# Patient Record
Sex: FEMALE | Race: WHITE | NOT HISPANIC OR LATINO | Employment: FULL TIME | URBAN - METROPOLITAN AREA
[De-identification: names, ages, dates, MRNs, and addresses within clinical notes are randomized per-mention and may not be internally consistent; named-entity substitution may affect disease eponyms.]

---

## 2023-07-26 ENCOUNTER — HOSPITAL ENCOUNTER (OUTPATIENT)
Dept: DATA CONVERSION | Facility: HOSPITAL | Age: 53
End: 2023-07-26
Attending: SURGERY | Admitting: SURGERY
Payer: COMMERCIAL

## 2023-07-26 DIAGNOSIS — C50.411 MALIGNANT NEOPLASM OF UPPER-OUTER QUADRANT OF RIGHT FEMALE BREAST (MULTI): ICD-10-CM

## 2023-07-26 DIAGNOSIS — Z17.0 ESTROGEN RECEPTOR POSITIVE STATUS (ER+): ICD-10-CM

## 2023-07-26 DIAGNOSIS — C50.911 MALIGNANT NEOPLASM OF UNSPECIFIED SITE OF RIGHT FEMALE BREAST (MULTI): ICD-10-CM

## 2023-07-26 DIAGNOSIS — C50.919 MALIGNANT NEOPLASM OF UNSPECIFIED SITE OF UNSPECIFIED FEMALE BREAST (MULTI): ICD-10-CM

## 2023-07-26 DIAGNOSIS — R92.8 OTHER ABNORMAL AND INCONCLUSIVE FINDINGS ON DIAGNOSTIC IMAGING OF BREAST: ICD-10-CM

## 2023-07-26 DIAGNOSIS — C77.3 SECONDARY AND UNSPECIFIED MALIGNANT NEOPLASM OF AXILLA AND UPPER LIMB LYMPH NODES (MULTI): ICD-10-CM

## 2023-08-07 LAB
COMPLETE PATHOLOGY REPORT: NORMAL
CONVERTED ADDENDUM DIAGNOSIS 2: NORMAL
CONVERTED CLINICAL DIAGNOSIS-HISTORY: NORMAL
CONVERTED FINAL DIAGNOSIS: NORMAL
CONVERTED FINAL REPORT PDF LINK TO COPY AND PASTE: NORMAL
CONVERTED GROSS DESCRIPTION: NORMAL
CONVERTED SYNOPTIC DIAGNOSIS: NORMAL

## 2023-09-06 ENCOUNTER — HOSPITAL ENCOUNTER (OUTPATIENT)
Dept: DATA CONVERSION | Facility: HOSPITAL | Age: 53
End: 2023-09-06
Attending: SURGERY | Admitting: SURGERY
Payer: COMMERCIAL

## 2023-09-06 DIAGNOSIS — C50.911 MALIGNANT NEOPLASM OF UNSPECIFIED SITE OF RIGHT FEMALE BREAST (MULTI): ICD-10-CM

## 2023-09-06 DIAGNOSIS — Z17.0 ESTROGEN RECEPTOR POSITIVE STATUS (ER+): ICD-10-CM

## 2023-09-06 LAB
HEMATOCRIT (%) IN BLOOD BY AUTOMATED COUNT: 41.5 % (ref 36–46)
HEMOGLOBIN (G/DL) IN BLOOD: 13.8 G/DL (ref 12–16)

## 2023-09-12 LAB
COMPLETE PATHOLOGY REPORT: NORMAL
CONVERTED CLINICAL DIAGNOSIS-HISTORY: NORMAL
CONVERTED FINAL DIAGNOSIS: NORMAL
CONVERTED FINAL REPORT PDF LINK TO COPY AND PASTE: NORMAL
CONVERTED GROSS DESCRIPTION: NORMAL

## 2023-09-29 VITALS — BODY MASS INDEX: 30.35 KG/M2 | WEIGHT: 171.3 LBS | HEIGHT: 63 IN

## 2023-09-29 VITALS
SYSTOLIC BLOOD PRESSURE: 162 MMHG | BODY MASS INDEX: 34.65 KG/M2 | RESPIRATION RATE: 18 BRPM | HEIGHT: 63 IN | DIASTOLIC BLOOD PRESSURE: 73 MMHG | HEART RATE: 72 BPM | WEIGHT: 195.55 LBS | TEMPERATURE: 97 F

## 2023-09-30 NOTE — H&P
"    History & Physical Reviewed:   Pregnant/Lactating:  ·  Are You Pregnant no (1)   ·  Are You Currently Breastfeeding no (1)     I have reviewed the History and Physical dated:  10-Aug-2023   History and Physical reviewed and relevant findings noted. Patient examined to review pertinent physical  findings.: No significant changes   Home Medications Reviewed: no changes noted   Allergies Reviewed: no changes noted       ERAS (Enhanced Recovery After Surgery):  ·  ERAS Patient: no     Consent:   COVID-19 Consent:  ·  COVID-19 Risk Consent Surgeon has reviewed key risks related to the risk of eleni COVID-19 and if they contract COVID-19 what the risks are.       Electronic Signatures:  Odalis Richardson (MD)  (Signed 06-Sep-2023 22:07)   Authored: History & Physical Reviewed, ERAS, Consent,  Note Completion      Last Updated: 06-Sep-2023 22:07 by Odalis Richardson (MD)    References:  1.  Data Referenced From \"Patient Profile - Preop v3\" 05-Sep-2023 11:09   "

## 2023-09-30 NOTE — H&P
History of Present Illness:   Pregnant/Lactating:  ·  Are You Pregnant no   ·  Are You Currently Breastfeeding no     History Present Illness:  Reason for surgery: Right invasive ductal carcinoma   HPI:    Amber Gaston is a 52 year old F with right invasive ductal caricnoma ER>95%, CA neg, HER2 neg, grade 1-2, stage 1A. Located at 10 o'clock 4 cm from nipple measuring  8 x 5 x 9 mm.     Past Medical History:  Chron's disease    Past Surgical History:  none    Social History:  former smoker  social EtOH    Family History:  breast, esophageal and renal carcinoma    Medications:  Stelara    Allergies:  NKDA    ROS:  negative unless otherwise stated above      Allergies:        Allergies:  ·  No Known Allergies :     Home Medication Review:   Home Medications Reviewed: yes     Impression/Procedure:   ·  Impression and Planned Procedure: Right magseed localized partial mastectomy, sentinel lymph node biopsy       ERAS (Enhanced Recovery After Surgery):  ·  ERAS Patient: no       Vital Signs:  Temperature C: 36.1 degrees C   Temperature F: 96.9 degrees F   Heart Rate: 72 beats per minute   Respiratory Rate: 18 breath per minute   Blood Pressure Systolic: 162 mm/Hg   Blood Pressure Diastolic: 73 mm/Hg     Physical Exam by System:    Constitutional: Awake, alert, no acute distress   ENMT: Moist mucus membranes   Respiratory/Thorax: No increased work of breathing  on room air   Cardiovascular: Regular rate   Gastrointestinal: Abdomen soft, nontender, nondistended   Genitourinary: Voiding spontaneously   Musculoskeletal: Moves all extremities   Extremities: No peripheral edema   Neurological: No focal deficits   Breast: Operative site marked   Psychological: Appropriate   Skin: Warm and dry     Consent:   COVID-19 Consent:  ·  COVID-19 Risk Consent Surgeon has reviewed key risks related to the risk of eleni COVID-19 and if they contract COVID-19 what the risks are.     Attestation:   Note Completion:  I am a:   Resident/Fellow   Attending Attestation I saw and evaluated the patient.  I personally obtained the key and critical portions of the history and physical exam or was physically present for key and  critical portions performed by the resident/fellow. I reviewed the resident/fellow?s documentation and discussed the patient with the resident/fellow.  I agree with the resident/fellow?s medical decision making as documented in the note.     I personally evaluated the patient on 26-Jul-2023         Electronic Signatures:  Carmen Retana (MD (Resident))  (Signed 26-Jul-2023 12:16)   Authored: History of Present Illness, Allergies, Home  Medication Review, Impression/Procedure, ERAS, Physical Exam, Consent, Note Completion  Odalis Richardson)  (Signed 28-Jul-2023 11:49)   Authored: Impression/Procedure, Note Completion   Co-Signer: History of Present Illness, Allergies, Home Medication Review, Impression/Procedure, ERAS, Physical Exam, Consent, Note Completion      Last Updated: 28-Jul-2023 11:49 by Odalis Richardson)

## 2023-10-01 NOTE — OP NOTE
PROCEDURE DETAILS    Preoperative Diagnosis:  Right breast invasive ductal carcinoma, ER+/WV-/HER2-, with positive margin  Postoperative Diagnosis:  Right breast invasive ductal carcinoma, ER+/WV-/HER2-, with positive margin  Surgeon: Odalis Richardson  Resident/Fellow/Other Assistant: Cindy Escobedo    Procedure:  1. Right breast re-excision partial mastectomy  2.  Right breast local tissue rearrangement      Anesthesia: No anesthesiologist associated with this case  Estimated Blood Loss: 5 cc  Findings: right breast partial mastectomy cavity with clips intact  Specimens(s) Collected: yes,  right breast margins: superior medial, superior lateral    Complications: none immediate   Patient Returned To/Condition: PACU/stable        Operative Report:   INDICATIONS FOR PROCEDURE:    Amber Gaston is a 52-year-old female who underwent right breast partial mastectomy and SLN B 7/26/23. Surgical pathology yielded [id number] and the superior margin had invasive carcinoma  at the superior margin. I recommended margin re-excision. Following a detailed discussion regarding risks, benefits, and alternatives, informed consent was obtained, and we agreed to proceed.      DESCRIPTION OF PROCEDURE:    The patient was brought to the operating room and positioned supine on the OR table.  Following patient identification and a time-out procedure, SCDs were applied, and antibiotics  were administered.  General anesthesia was initiated. The operative field was then prepped and draped in the standard sterile fashion.       We turned our attention to the right breast. The previous periareolar  incision was opened sharply with a 15 blade. Old suture material was removed. Dissection was carried down through the skin and subcutaneous tissues. Soft tissue flaps were then raised  in all directions: superior, inferior, medial, and lateral. Dissection was carried out sharply and bluntly moving laterally and superiorly. Local tissue  rearrangement had been performed during the previous partial mastectomy, and the re-approximated tissue  was  bluntly and using electrocautery to expose the original cavity. We identified the previous margins marked with marking clips.  The superior margin was re-excised: superior medial and superior lateral. Notably, we were down to pectoralis  fasia posteriorly. All specimens were oriented with a clip and ink on the new margin and sent to pathology for permanent section. The cavity was copiously irrigated with sterile saline solution.  Hemostasis was achieved. 2 new marking clips were placed  within the cavity on the sueperior margin.      The soft tissue defect from her prior partial mastectomy was 20 cm squared, and with new margin re-excision, it now measured 30 cm squared. Local tissue rearrangement was performed over a larger area with further dissection radially to close the defect.  Surrounding breast parenchyma was mobilized circumferentially from the anterior mastectomy plane anteriorly and posteriorly from the pectoralis fascia.  Once mobilized, the superior-medial and inferior-lateral pillars of breast tissue were advanced and  secured to each other with a dyed 3-0 Vicryl suture in an interrupted fashion to close the defect. Hemostasis was reconfirmed. The more superficial layer of tissue that had been mobilized at the beginning of the procedure was closed with 3-0 Vicryl suture  in an interrupted fashion in order to create a cosmetic effect.     0.5% Marcaine was injected subcutaneously for analgesia. The incision was then closed in layers with an interrupted 3-0 Vicryl in the deep dermis followed by a running subcuticular 4-0 Monocryl for the skin. Skin glue, a sterile dressing, fluffs, and  a surgical bra were then applied.    The patient tolerated the procedure well.  There were no immediate complications.  All counts were correct. Estimated blood loss was 5 cc.  I was present for and  performed the entire procedure. The patient was then awakened and transported to the PACU  in stable condition.    Odalis Richardson MD  Breast Surgical Oncology   pager [phone]      Note Recipients:   Sammie Mccoy DO                        Attestation:   Note Completion:  Attending Attestation I performed the procedure without a resident         Electronic Signatures:  Odalis Richardson)  (Signed 06-Sep-2023 14:29)   Authored: Post-Operative Note, Chart Review, Note Completion      Last Updated: 06-Sep-2023 14:29 by Odalis Richardson)

## 2023-10-02 NOTE — OP NOTE
PROCEDURE DETAILS    Preoperative Diagnosis:  Right breast invasive ductal carcinoma, ER+/AR-/HER-, clinical stage IA  Postoperative Diagnosis:  Right breast invasive ductal carcinoma, ER+/AR-/HER-, clinical stage IA  Surgeon: Dr. Odalis Richardson  Resident/Fellow/Other Assistant: Carmen Retana MD    Procedure:  1.  Injection of radiotracer for intraoperative lymphatic mapping.  2.  Injection of isosulfan blue for intraoperative lymphatic mapping.  3.  Right axillary sentinel lymph node biopsy with dual tracer intraoperative lymphatic mapping.  4.  Right breast Magseed localized partial mastectomy.  5.  Right breast local tissue rearrangement.  6. Intraoperative specimen radiograph interpretation    Anesthesia: General  Estimated Blood Loss: 20 mL  Blood Replaced: None  Findings: Right breast tissue with Magseed and clip in specimen on xray; SLN x4  Specimens(s) Collected: yes,  Right breast tissue, shave cavity margins; SLN x4   Complications: None apparent  IV Fluids: 1000 mL crystalloid  Urine Output: N/a  Drains and/or Catheters: None  Patient Returned To/Condition: PACU / stable        Operative Report:   INDICATIONS FOR PROCEDURE:    Amber Gaston is a 52-year-old female who presented with a screen detected right breast mass for which core needle biopsy yielded invasive ductal carcinoma, ER+, AR-, HER2-; [id number]  Stage IA.  We met in surgical consultation, and I personally reviewed her imaging and pathology. I recommended right partial mastectomy, and right axillary sentinel lymph node biopsy. Following a detailed discussion regarding risks, benefits, and alternatives,  informed consent was obtained, and we agreed to proceed. Prior to her procedure today she had a magnetic seed (Magseed) placed in the previously biopsied right breast for intraoperative localization purposes.  My personal review of her post procedure  images demonstrated the Magseed in good position relative to the mass and biopsy clip.      DESCRIPTION OF PROCEDURE:    The patient was brought to the operating room and positioned supine on the OR table.  Following patient identification and a time-out procedure, SCDs were applied, and antibiotics  were administered.  General anesthesia was initiated. I personally injected technetium-99 and 4mL of isosulfan blue into the patient? s right breast for dual tracer intraoperative lymphatic mapping.   The breast was massaged for 5 minutes. I used the Neoprobe to confirm uptake of the radiotracer in the breast and axilla, and the Sentimag probe to cofirm placement of the Magseed within  the right breast.  The operative field was prepped and draped in a standard sterile fashion.     We turned our attention to the right breast. The cancer was located in the upper outer quadrant at mid depth. A periareolar incision was chosen to hide the surgical scar in a cosmetic location. 1% lidocaine was injected subcutaneously.  The skin was incised  sharply.  Dissection was carried down through skin and subcutaneous tissues. Soft tissue flaps were then raised in all directions: superior, inferior, medial, and lateral.  The tumor was located superior and lateral to our incision; therefore, a tunneling  technique was used.  I identified the anterior mastectomy plane between the subcutaneous tissues and the anterior breast parenchyma. I then developed the mastectomy flap widely up to and beyond the lesion. The Sentimag probe was used to identify the Magseed  in the area targeted for excision, and a marking stitch was placed within it for dissection purposes. This area was widely and circumferentially dissected from the surrounding tissues using electrocautery.  The specimen was excised and labeled as right  breast tissue.  It was marked with a double short stitch superior, double long stitch lateral, and a single stitch anterior. I personally inked all 6 margins of the entire specimen with the Vector kit using colors as  prescribed.  It was placed into the  Trident machine for an intraoperative radiograph which demonstrated the mass, the biopsy clip, and the Magseed within the specimen.  I personally interpreted these images intraoperatively.  The specimen was then sent to Pathology for permanent section.     I then proceeded to take shave cavity margins circumferentially: superior, medial, inferior, lateral, anterior, and posterior. Each was removed, marked with a clip and ink on the new  margin, and sent separately to Pathology for permanent section. The excision cavity was copiously irrigated with warm sterile saline solution.  Hemostasis was achieved.  I then placed marking clips circumferentially around the cavity: superior, inferior,  medial, lateral, posterior and anterior.     The specimen measured 2 cm x 3 cm x 2 cm, and with shave cavity margins, the soft tissue defect was approximately 20 sq cm; therefore, local tissue rearrangement was performed. Surrounding  breast parenchyma was mobilized circumferentially from the anterior mastectomy plane anteriorly and posteriorly from within the breast parenchyma.  Once mobilized, the superior medial and inferior lateral pillars were advanced centrally and secured to  each other with a dyed 3-0 Vicryl suture in an interrupted fashion to close the defect. Hemostasis was reconfirmed. The more superficial layer of tissue that had been mobilized in the breast at the beginning of the procedure was similarly closed in order  to create a cosmetic effect.     We turned our attention to the axilla.  An incision was marked at the base of the hair-bearing skin.  1% lidocaine was injected subcutaneously. An incision was made sharply.  Dissection  was carried down through the skin and subcutaneous tissues to the clavipectoral fascia, which was widely incised.  I used the Neoprobe to identify an area of increased radiotracer uptake, which led us to a lymph node. It was carefully dissected from  surrounding  tissues using electrocautery.  Small clips were used on branching vessels and lymphatics.  The lymph node was excised and actually contained 4 nodes.  Right axillary sentinel lymph node #1, #2, #4 were hot only; #3 was hot and blue. All were sent to Pathology  for permanent section. There were no additional visibly blue lymph nodes, no lymph nodes with increased radiotracer uptake, and no palpably suspicious nodes.  The axilla was copiously irrigated with warm sterile saline solution.  Hemostasis was achieved.        0.5% Marcaine was injected subcutaneously for analgesia into both the breast and axillary incisions.  The incisions were then closed in layers with an interrupted 3-0 Vicryl in the  deep dermis followed by a running subcuticular 4-0 Monocryl for the skin. Skin glue, sterile dressings, fluffs, and a surgical bra were then applied.     The patient tolerated the procedure well.  There were no immediate complications.  All counts were correct.  Estimated blood loss was 20 cc.  I was present for and performed the entire  procedure.  The patient was then awakened and transported to the PACU in stable condition.      Odalis Richardson MD  Breast Surgical Oncology   pager [phone]      Note Recipients:   Sammie Mccoy DO Coc Synoptic Op Report:  Breast [place] Node Biopsy  Operation performed with curative intent: yes  Tracer(s) used to identify sentinel nodes in the upfront surgery (non-neoadjuvant) setting: dye and radioactive tracer  Tracer(s) used to identify sentinel nodes in the neoadjuvant setting: not applicable  All nodes (colored or non-colored) present at the end of a dye-filled lymphatic channel were removed: yes  All significantly radioactive nodes were removed: yes  All palpably suspicious nodes were removed: not applicable  Biopsy-proven positive nodes marked with clips prior to chemotherapy were identified and removed: not applicable                    Attestation:   Note  Completion:  Attending Attestation I was present for the entire procedure   I am a: Resident/Fellow         Electronic Signatures:  Carmen Retana (Resident))  (Signed 26-Jul-2023 14:40)   Authored: Post-Operative Note, Chart Review, Note Completion  Odalis Richardson)  (Signed 26-Jul-2023 17:33)   Authored: Post-Operative Note, Chart Review, Note Completion   Co-Signer: Post-Operative Note, Chart Review, Note Completion      Last Updated: 26-Jul-2023 17:33 by Odalis Richardson)

## 2023-10-07 PROBLEM — N63.10 MASS OF RIGHT BREAST: Status: ACTIVE | Noted: 2023-10-07

## 2023-10-07 PROBLEM — C50.919 MALIGNANT NEOPLASM OF BREAST (MULTI): Status: ACTIVE | Noted: 2023-05-18

## 2023-10-07 RX ORDER — RIFAXIMIN 550 MG/1
550 TABLET ORAL DAILY
COMMUNITY
Start: 2023-01-17 | End: 2023-10-22 | Stop reason: WASHOUT

## 2023-10-07 RX ORDER — ACETAMINOPHEN AND CODEINE PHOSPHATE 300; 30 MG/1; MG/1
1 TABLET ORAL EVERY 4 HOURS PRN
COMMUNITY
Start: 2023-03-30 | End: 2023-10-22 | Stop reason: WASHOUT

## 2023-10-07 RX ORDER — USTEKINUMAB 90 MG/ML
INJECTION, SOLUTION SUBCUTANEOUS
COMMUNITY
Start: 2021-05-14

## 2023-10-07 RX ORDER — TRAMADOL HYDROCHLORIDE 50 MG/1
50 TABLET ORAL EVERY 6 HOURS PRN
COMMUNITY
Start: 2023-07-26 | End: 2023-10-22 | Stop reason: WASHOUT

## 2023-10-07 RX ORDER — CHLORHEXIDINE GLUCONATE ORAL RINSE 1.2 MG/ML
SOLUTION DENTAL
COMMUNITY
Start: 2023-03-30 | End: 2023-10-22 | Stop reason: WASHOUT

## 2023-10-07 RX ORDER — ESTRADIOL 1 MG/1
1 TABLET ORAL DAILY
COMMUNITY
Start: 2021-01-28 | End: 2023-10-10 | Stop reason: ALTCHOICE

## 2023-10-07 RX ORDER — IBUPROFEN 800 MG/1
800 TABLET ORAL 4 TIMES DAILY
COMMUNITY
Start: 2023-03-30 | End: 2023-10-22 | Stop reason: WASHOUT

## 2023-10-07 RX ORDER — PROGESTERONE 100 MG/1
100 CAPSULE ORAL NIGHTLY
COMMUNITY
Start: 2021-04-22 | End: 2023-10-10 | Stop reason: ALTCHOICE

## 2023-10-10 ENCOUNTER — HOSPITAL ENCOUNTER (OUTPATIENT)
Dept: RADIATION ONCOLOGY | Facility: CLINIC | Age: 53
Setting detail: RADIATION/ONCOLOGY SERIES
Discharge: STILL ADMITTED OR ADMINISTRATIVE | End: 2023-10-10
Payer: COMMERCIAL

## 2023-10-10 VITALS
DIASTOLIC BLOOD PRESSURE: 89 MMHG | WEIGHT: 168.87 LBS | BODY MASS INDEX: 29.92 KG/M2 | HEIGHT: 63 IN | HEART RATE: 84 BPM | OXYGEN SATURATION: 98 % | TEMPERATURE: 97.8 F | RESPIRATION RATE: 16 BRPM | SYSTOLIC BLOOD PRESSURE: 160 MMHG

## 2023-10-10 PROBLEM — Z17.0 MALIGNANT NEOPLASM OF UPPER-OUTER QUADRANT OF RIGHT BREAST IN FEMALE, ESTROGEN RECEPTOR POSITIVE (MULTI): Status: ACTIVE | Noted: 2023-10-10

## 2023-10-10 PROBLEM — C50.411 MALIGNANT NEOPLASM OF UPPER-OUTER QUADRANT OF RIGHT BREAST IN FEMALE, ESTROGEN RECEPTOR POSITIVE (MULTI): Status: ACTIVE | Noted: 2023-10-10

## 2023-10-10 PROCEDURE — 99215 OFFICE O/P EST HI 40 MIN: CPT | Performed by: RADIOLOGY

## 2023-10-10 PROCEDURE — 99205 OFFICE O/P NEW HI 60 MIN: CPT | Performed by: RADIOLOGY

## 2023-10-10 ASSESSMENT — ENCOUNTER SYMPTOMS
CARDIOVASCULAR NEGATIVE: 1
EYES NEGATIVE: 1
HEMATOLOGIC/LYMPHATIC NEGATIVE: 1
HOT FLASHES: 1
NEUROLOGICAL NEGATIVE: 1
RESPIRATORY NEGATIVE: 1
PSYCHIATRIC NEGATIVE: 1
DIAPHORESIS: 1
DIARRHEA: 1

## 2023-10-10 ASSESSMENT — PAIN SCALES - GENERAL: PAINLEVEL: 0-NO PAIN

## 2023-10-10 ASSESSMENT — PATIENT HEALTH QUESTIONNAIRE - PHQ9
2. FEELING DOWN, DEPRESSED OR HOPELESS: NOT AT ALL
1. LITTLE INTEREST OR PLEASURE IN DOING THINGS: NOT AT ALL
SUM OF ALL RESPONSES TO PHQ9 QUESTIONS 1 AND 2: 0

## 2023-10-10 ASSESSMENT — COLUMBIA-SUICIDE SEVERITY RATING SCALE - C-SSRS
1. IN THE PAST MONTH, HAVE YOU WISHED YOU WERE DEAD OR WISHED YOU COULD GO TO SLEEP AND NOT WAKE UP?: NO
2. HAVE YOU ACTUALLY HAD ANY THOUGHTS OF KILLING YOURSELF?: NO
6. HAVE YOU EVER DONE ANYTHING, STARTED TO DO ANYTHING, OR PREPARED TO DO ANYTHING TO END YOUR LIFE?: NO

## 2023-10-10 NOTE — ADDENDUM NOTE
Encounter addended by: Donald Mi RN on: 10/10/2023 4:03 PM   Actions taken: Patient Education assessment filed, Patient Education title added, Patient Education documented on

## 2023-10-10 NOTE — PROGRESS NOTES
Radiation Oncology Outpatient Consult    Patient Name:  Amber Gaston  MRN:  [id number]  :  1970    Referring Provider: Odalis Richardson MD  Primary Care Provider: Sammie Mccoy DO  Care Team: Patient Care Team:  Sammie Mccoy DO as PCP - General    Date of Service: 10/10/2023     SUBJECTIVE  History of Present Illness:  Amber Gaston is a 53 y.o. female who was referred by Odalis Richardson MD, for a consultation to the [facility] Department of Radiation Oncology.  She is presenting for evaluation and management of right breast cancer s/p breast conserving surgery.     Ms. Gaston is a 53-year-old female who appreciated a mass in her right breast with associated pain at the end of April.  On 2023 she underwent a mammogram which revealed a spiculated irregular equal density mass in the upper outer quadrant of the right breast as well as focal asymmetry in the medial right breast which dispersed with compression.  There was an additional area of asymmetry in the medial right breast.  Ultrasound directed at the 10 o'clock position, 4 cm from the nipple revealed a 0.8 x 0.5 x 0.9 cm hypoechoic mass.  She underwent a right breast core biopsy on 2023 which revealed invasive ductal carcinoma, grade 1-2.  Estrogen receptors stained positive at greater than 95%.  Progesterone receptors were negative.  HER2/vikas was 1+ IHC.  She underwent a breast MRI on 2023 which revealed a 0.6 x 0.6 cm enhancing mass in the upper outer quadrant of the right breast consistent with her biopsy-proven malignancy.  There was non-mass enhancement spanning 1.2 cm in the superior right breast.  She underwent an MRI guided biopsy of this non-mass enhancement on 2023 which revealed an intraductal papilloma.  On 2023 she underwent a right partial mastectomy with sentinel lymph node biopsy.  Pathology revealed a 1.3 cm invasive ductal carcinoma, grade 2.  No angiolymphatic invasion was  present.  There was no ductal carcinoma in situ.  There was a positive superior margin of resection.  4 sentinel lymph nodes were removed, 1 of which contained a 0.025 cm micrometastasis.  She underwent a reexcision on 2023 which revealed no residual tumor.  Her tumor was sent for Oncotype Dx testing and her recurrence score returned as 14.  She has an appointment to meet with Dr. Tracey next week to discuss endocrine therapy.  I was asked to see Ms. Gaston by Dr. Odalis Richardson for a discussion regarding the role of radiation in the management of this newly diagnosed breast cancer.    Prior Radiotherapy:  No  Radiation Treatments       No radiation treatments to show. (Treatments may have been administered in another system.)          Current Systemic Treatment:  No     Presence of Pacemaker or ICD:  No    Past Medical History:    Past Medical History:   Diagnosis Date    Acute vaginitis 2021    Acute vaginitis    Malignant neoplasm of upper-outer quadrant of right breast in female, estrogen receptor positive (CMS/HCC) 10/10/2023        Past Surgical History:    Past Surgical History:   Procedure Laterality Date    BREAST LUMPECTOMY Left 2023    CHOLECYSTECTOMY Right 2020    KNEE ARTHROSCOPY W/ LATERAL RELEASE Left     SMALL INTESTINE SURGERY Right 10/14/2019    TONSILLECTOMY          Family History:  Cancer-related family history includes Brain cancer in her father's brother; Breast cancer in her maternal grandmother; Esophageal cancer in her father; Kidney cancer in her paternal grandfather.    Social History:    Social History     Tobacco Use    Smoking status: Former     Packs/day: 0.25     Years: 25.00     Additional pack years: 0.00     Total pack years: 6.25     Types: Cigarettes     Quit date: 2019     Years since quittin.7    Smokeless tobacco: Never   Vaping Use    Vaping Use: Never used   Substance Use Topics    Alcohol use: Yes     Alcohol/week: 2.0 standard drinks of alcohol      "Types: 2 Glasses of wine per week    Drug use: Never     Gynecologic History: Menarche age 12.  G0.  She went through menopause at the age of 49.  She took oral contraceptive pills for 32 years.  She took hormone replacement therapy for 3 years.    Allergies:  No Known Allergies     Medications:    Current Outpatient Medications:     Stelara injection, Stelara 90 MG/ML Subcutaneous Solution Prefilled Syringe Refills: 0  Start: 14-May-2021 Milliliter, Disp: , Rfl:     acetaminophen-codeine (Tylenol w/ Codeine #3) 300-30 mg tablet, Take 1 tablet by mouth every 4 hours if needed (pain)., Disp: , Rfl:     chlorhexidine (Peridex) 0.12 % solution, , Disp: , Rfl:     ibuprofen 800 mg tablet, Take 1 tablet (800 mg) by mouth 4 times a day. OR EVERY 8 HOURS AS NEEDED FOR PAIN, Disp: , Rfl:     traMADol (Ultram) 50 mg tablet, Take 1 tablet (50 mg) by mouth every 6 hours if needed., Disp: , Rfl:     Xifaxan 550 mg tablet, Take 1 tablet (550 mg) by mouth once daily., Disp: , Rfl:       Review of Systems:  Review of Systems   Constitutional:  Positive for diaphoresis.   HENT:  Negative.     Eyes: Negative.    Respiratory: Negative.     Cardiovascular: Negative.    Gastrointestinal:  Positive for diarrhea.   Endocrine: Positive for hot flashes.   Genitourinary: Negative.     Skin: Negative.    Neurological: Negative.    Hematological: Negative.    Psychiatric/Behavioral: Negative.       The patient's current pain level was assessed.  They report currently having a pain of 0 out of 10.  They feel their pain is under control without the use of pain medications.    Performance Status:  The Karnofsky performance scale today is 100, Fully active, able to carry on all pre-disease performed without restriction (ECOG equivalent 0).        OBJECTIVE  Physical Exam:  /89 (BP Location: Left arm, Patient Position: Sitting, BP Cuff Size: Adult)   Pulse 84   Temp 36.6 °C (97.8 °F) (Temporal)   Resp 16   Ht 1.598 m (5' 2.91\")   Wt " 76.6 kg (168 lb 14 oz)   SpO2 98%   BMI 30.00 kg/m²    Physical Exam  Constitutional:       Appearance: Normal appearance.   HENT:      Head: Normocephalic and atraumatic.   Eyes:      Conjunctiva/sclera: Conjunctivae normal.   Cardiovascular:      Heart sounds: Normal heart sounds. No murmur heard.  Pulmonary:      Breath sounds: Normal breath sounds.   Chest:      Comments: Examination of the right breast reveals a well-healing superior circumareolar incision with some moderate-sized seroma beneath.  There are no suspicious nodules, nipple retraction, or nipple discharge bilaterally.  Abdominal:      Palpations: Abdomen is soft.   Musculoskeletal:      Right lower leg: No edema.      Left lower leg: No edema.   Lymphadenopathy:      Cervical: No cervical adenopathy.      Upper Body:      Right upper body: No supraclavicular or axillary adenopathy.      Left upper body: No supraclavicular or axillary adenopathy.   Skin:     General: Skin is warm and dry.   Neurological:      Mental Status: She is alert.          Pathology Review:  The pertinent pathology results were reviewed and discussed with the patient.       Imaging:  The pertinent imaging results were reviewed and discussed with the patient.          ASSESSMENT:  Amber Gaston is a 53 y.o. female with Malignant neoplasm of upper-outer quadrant of right breast in female, estrogen receptor positive (CMS/HCC), Pathologic: Stage IIA (pT1c, pN1a(sn), cM0, G2, ER+, CT-, HER2-, Oncotype DX score: 14), status post right partial mastectomy with sentinel lymph node biopsy.     PLAN: We had a long conversation with the patient detailing the role of radiation in the management of this node positive breast cancer as well as the controversy over whether or not to include the lymph node areas in the radiation field.  We did discuss an open clinical trial randomizing patients between treating the breast alone versus the breast and comprehensive victorino areas.  We did also  discuss some of the data looking at the role of treating lymph node areas in the setting of a micrometastasis.  Off trial I would lean towards high tangents.  The side effects of radiation discussed included but were not limited to skin irritation with possible breakdown, fatigue, possible poor cosmetic outcome, rib fragility, pulmonary toxicity, lymphedema and the possibility of a secondary malignancy induced by the radiation.  She voices understanding and wishes to proceed.  She does prefer her treatment closer to home at [facility].  I have asked her to call me with any additional questions or concerns.    NCCN Guidelines were applicable to guide this patients treatment plan.   Daniella Tovar MD

## 2023-10-20 ENCOUNTER — OFFICE VISIT (OUTPATIENT)
Dept: HEMATOLOGY/ONCOLOGY | Facility: CLINIC | Age: 53
End: 2023-10-20
Payer: COMMERCIAL

## 2023-10-20 VITALS
HEART RATE: 75 BPM | BODY MASS INDEX: 30.08 KG/M2 | SYSTOLIC BLOOD PRESSURE: 162 MMHG | TEMPERATURE: 97.3 F | DIASTOLIC BLOOD PRESSURE: 93 MMHG | OXYGEN SATURATION: 98 % | WEIGHT: 169.31 LBS

## 2023-10-20 DIAGNOSIS — Z17.0 MALIGNANT NEOPLASM OF UPPER-OUTER QUADRANT OF RIGHT BREAST IN FEMALE, ESTROGEN RECEPTOR POSITIVE (MULTI): Primary | ICD-10-CM

## 2023-10-20 DIAGNOSIS — C50.411 MALIGNANT NEOPLASM OF UPPER-OUTER QUADRANT OF RIGHT BREAST IN FEMALE, ESTROGEN RECEPTOR POSITIVE (MULTI): Primary | ICD-10-CM

## 2023-10-20 PROCEDURE — 1036F TOBACCO NON-USER: CPT | Performed by: INTERNAL MEDICINE

## 2023-10-20 PROCEDURE — 99205 OFFICE O/P NEW HI 60 MIN: CPT | Performed by: INTERNAL MEDICINE

## 2023-10-20 PROCEDURE — 99215 OFFICE O/P EST HI 40 MIN: CPT | Performed by: INTERNAL MEDICINE

## 2023-10-20 RX ORDER — ANASTROZOLE 1 MG/1
1 TABLET ORAL DAILY
Qty: 30 TABLET | Refills: 5 | Status: SHIPPED | OUTPATIENT
Start: 2023-10-20 | End: 2024-04-12 | Stop reason: SDUPTHER

## 2023-10-20 ASSESSMENT — PAIN SCALES - GENERAL: PAINLEVEL: 0-NO PAIN

## 2023-10-20 NOTE — PROGRESS NOTES
Patient Visit Information:   Date of Service: 10/20/2023   Referring Provider: Dr. Odalis Richardson  Visit Type: New Visit      Cancer History:          Breast         AJCC Edition: 8th (AJCC), Diagnosis Date: 05/19/2023        ONCOLOGIC HISTORY:    Malignant neoplasm of upper-outer quadrant of right breast in female, estrogen receptor positive (CMS/HCC), Pathologic: Stage IIA (pT1c, pN1a(sn), cM0, G2, ER+, DE-, HER2-, Oncotype DX score: 14)    Treatment Synopsis:      53 y.o. postmenopausal  female with right-sided invasive ductal carcinoma, Stage IIA (pT1c, pN1a(sn), cM0, G2, ER+, DE-, HER2-, Oncotype DX score: 14).  The patient's breast cancer was diagnosed on May 19, 2023, and is grade 1-2, estrogen receptor positive at >95%, progesterone receptor negative, and HER-2/vikas negative.    Oncotype DX Recurrence Score of 14, translating to a 14% recurrence rate with systemic endocrine therapy and no apparent benefit from chemotherapy.     Details of her history are as follows:     05/04/2023: Patient underwent a bilateral diagnostic mammogram and right breast US. This revealed a new mass in the right breast at 10:00, 4         cm from the nipple measuring 8 x 5 x 9 mm. 5 normal looking Lns were seen on the right. Benign coarse calcifications were seen in the left         breast.     05/18/2023: Patient under an US-guided core biopsy of the right breast with results as above    06/16/2023: Patient underwent a bilateral MRI of the breast. This revealed area of biopsy proven malignancy in the right breast. In addition        there  was an additional suspicious area of linear non-mass enhancement in the right breast. MRI-guided biopsy was  recommended. No         abnormality was noted in the left breast.     06/27/2023: Patient underwent an MRI-guided core needle biopsy of the non-mass enhancement in the right breast. Pathology was consistent         with an intra-ductal papilloma.     07/26/2023: Patient underwent a  right partial mastectomy and SLNB. Pathology was consistent with a single focus of invasive ductal carcinoma measuring 1.3 cm, grade 2, with 1/4 SLNs positive for carcinoma with victorino metastasis measuring 0.25mm. Invasive carcinoma was present at superior margin    09/06/2023: Patient underwent re-excision of superior margins of the right breast with no evidence of carcinoma         History of Present Illness: Patient ID: Amber Gaston is a 53 y.o. female.        Chief Complaint:   Here for the adjuvant management of newly diagnosed right breast cancer     Interval History:      Ms. Gaston is a pleasant 53 y.o. postmenopausal  female with right-sided invasive ductal carcinoma, Stage IIA (pT1c, pN1a(sn), cM0, G2, ER+, [place]-, HER2-, Oncotype DX score: 14).  The patient's breast cancer was diagnosed on May 19, 2023, and is grade 1-2, estrogen receptor positive at >95%, progesterone receptor negative, and HER-2/vikas negative.    Oncotype DX Recurrence Score of 14, translating to a 14% recurrence rate with systemic endocrine therapy and no apparent benefit from chemotherapy.     Details of her history are as follows:     Patient had previously undergone a screening mammogram on 2017 which showed left breast calcifications for which she was under short-term follow-up through to 2018 after which she appears to have been lost to follow-up.     On 05/04/2023, patient underwent a bilateral diagnostic mammogram and right breast US. This revealed a new mass in the right breast at 10:00, 4   cm from the nipple measuring 8 x 5 x 9 mm. 5 normal looking Lns were seen on the right. Benign coarse calcifications were seen in the left breast. Patient under an US-guided core biopsy of the right breast on 05/18/2023 with results as above.     In addition, patient underwent a bilateral MRI of the breast on 06/16/2023.  This revealed area of biopsy proven malignancy in the right breast. In addition there  was an additional suspicious  area of linear non-mass enhancement in the right breast. MRI-guided biopsy was  recommended. No  abnormality was noted in the left breast. She underwent an MRI-guided core needle biopsy of the non-mass enhancement in the right breast. On 06/27/2023. 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     On 07/26/2023, patient underwent a right partial mastectomy and SLNB. Pathology was consistent with a single focus of invasive ductal carcinoma measuring 1.3 cm, grade 2, with 1/4 SLNs positive for carcinoma with victorino metastasis measuring 0.25mm. Invasive carcinoma was present at superior margin. Patient underwent re-excision of superior margins of the right breast on -9/06/2023, with no evidence of carcinoma.    She comes in for adjuvant treatment consideration. She is scheduled to undergo radiation therapy at [facility].      Review of Systems:     Review of Systems - Oncology  A 14-point review of system was completed and was negative except for what is noted in HPI.                Allergies and Intolerances:       Allergies: No Known Allergies              Outpatient Medication Profile:   Current Outpatient Medications   Medication Sig Dispense Refill    acetaminophen-codeine (Tylenol w/ Codeine #3) 300-30 mg tablet Take 1 tablet by mouth every 4 hours if needed (pain).      chlorhexidine (Peridex) 0.12 % solution       ibuprofen 800 mg tablet Take 1 tablet (800 mg) by mouth 4 times a day. OR EVERY 8 HOURS AS NEEDED FOR PAIN      Stelara injection Stelara 90 MG/ML Subcutaneous Solution Prefilled Syringe  Refills: 0  Start: 14-May-2021  Milliliter      traMADol (Ultram) 50 mg tablet Take 1 tablet (50 mg) by mouth every 6 hours if needed.      Xifaxan 550 mg tablet Take 1 tablet (550 mg) by mouth once daily.       No current facility-administered medications for this visit.              Medical History:    Past Medical History:   Diagnosis Date    Acute vaginitis 04/22/2021    Acute vaginitis     Malignant neoplasm of upper-outer quadrant of right breast in female, estrogen receptor positive (CMS/HCC) 10/10/2023      Surgical History:   Past Surgical History:   Procedure Laterality Date    BREAST LUMPECTOMY Left 2023    CHOLECYSTECTOMY Right 2020    KNEE ARTHROSCOPY W/ LATERAL RELEASE Left     SMALL INTESTINE SURGERY Right 10/14/2019    TONSILLECTOMY                Family History:   Family History   Problem Relation Name Age of Onset    No Known Problems Mother      Esophageal cancer Father Mario     Brain cancer Father's Brother Jp     Breast cancer Maternal Grandmother Drea     Kidney cancer Paternal Grandfather David     Throat cancer Maternal Great-Grandmother       Family Oncology History:  Cancer-related family history includes Brain cancer in her father's brother; Breast cancer in her maternal grandmother; Esophageal cancer in her father; Kidney cancer in her paternal grandfather.     Breast Cancer Risk Factors:  , Had her menarche at age 12 years,  menopause at age 49 years, she took oral contraceptive pills for 32 years and hormone replacement therapy for 3 years    OBJECTIVE:    VS / Pain:  There were no vitals taken for this visit.  BSA: There is no height or weight on file to calculate BSA.   Pain Scale: 0    The ECOG performance scale today is Asymptomatic    Physical Exam      Physical Exam:      Constitutional: Well developed, awake/alert/oriented  x3, no distress, alert and cooperative   Eyes: PERRL, EOMI, clear sclera   ENMT: mucous membranes moist, no apparent injury,  no lesions seen   Head/Neck: Neck supple, no apparent injury, thyroid  without mass or tenderness, No JVD, trachea midline, no bruits   Respiratory/Thorax: Patent airways, CTAB, normal  breath sounds with good chest expansion, thorax symmetric   Cardiovascular: Regular, rate and rhythm, no murmurs,  2+ equal pulses of the extremities, normal S 1and S 2   Gastrointestinal: Nondistended, soft, non-tender,  no  rebound tenderness or guarding, no masses palpable, no organomegaly, +BS, no bruits   Musculoskeletal: ROM intact, no joint swelling, normal  strength   Extremities: Left upper extremity with hyperpigmentation  tracking along a vein starting in antecubital fossa   Neurological: alert and oriented x3, intact senses,  motor, response and reflexes, normal strength   Breast: s/p rt sided partial mastectomy with well  healed surgical incision. No palpable mass in the left breast. No palpable axillary or supraclavicular lymphadenopathies bilaterally. No swelling of either upper extremity which had free range of motion.   Lymphatic: No significant lymphadenopathy   Psychological: Appropriate mood and behavior   Skin: Warm and dry, no lesions, no rashes       RESULTS:     @LABORATORY DATA@  Lab Results   Component Value Date    HGB 13.8 09/06/2023    HCT 41.5 09/06/2023     Narrative & Impression   MRN: [id number]  Patient Name: NAIMA DAVID     STUDY:  MRI BREAST BILATERAL WITH CONTRAST FULL PROTOCOL;  6/16/2023 10:04 am     ACCESSION NUMBER(S):  [id number]     ORDERING CLINICIAN:  SANGEETHA COBURN     INDICATION:  Patient has a history of recently diagnosed right breast IDC.     COMPARISON:  Mammogram 10/22/2018 and 05/04/2023     TECHNIQUE:  Using a dedicated breast coil, STIR axial and T1-weighted fat  saturation axial images of the breasts were obtained, the latter both  before and after intravenous administration of Gadolinium DTPA. On an  independent workstation, 3-D images were formulated using [employer]  including time enhancement curves, subtraction images and MIP images.     Intravenous contrast:  15 milliliter of DOTAREM GADOTERATE MEGLUMINE  INJECTION     FINDINGS:  There is  symmetric  minimal bilateral background enhancement.     There is scattered fibroglandular tissue.     RIGHT BREAST:  A 0.6 x 0.6 enhancing mass is noted in upper outer  right breast at mid depth corresponding to the biopsy-proven  malignancy  (subtraction image 97/216). Susceptibility artifact within  this focus corresponds to the biopsy clip. No evidence of dermal,  pectoralis, chest wall, or nipple invasion identified. There is also  linear non mass enhancement in the slightly superior central breast  measuring up to 1.2 cm (subtraction image 116/216). This is also  better appreciated on a saved sagittal reconstruction image. There is  approximately 2.1 cm between this finding and the biopsy proven  malignancy.     No axillary or internal mammary lymphadenopathy is appreciated.     LEFT BREAST:  No suspicious mass or nonmass enhancement is identified.     No axillary or internal mammary lymphadenopathy is appreciated.     NON-BREAST FINDINGS:  None.     IMPRESSION:  1. Enhancing mass in the upper outer right breast, consistent with  biopsy-proven malignancy. No evidence of extension to the chest wall,  pectoralis, dermal, or nipple invasion. No suspicious axillary  lymphadenopathy.  2. There is an additional suspicious area of linear nonmass  enhancement in the right breast. MRI-guided biopsy is recommended.  3. No MRI evidence of malignancy in the left breast.     A preprocedure form was completed. A critical alert notification was  sent to the ordering provider.     BI-RADS CATEGORY:     Category: 4 - Suspicious.  Recommendation: Biopsy Recommended.            Assessment and Plan:   Assessment    Ms. Gaston is a pleasant 53 y.o. postmenopausal  female with right-sided invasive ductal carcinoma, Stage IIA (pT1c, pN1a(sn), cM0, G2, ER+, [place]-, HER2-, Oncotype DX score: 14).  The patient's breast cancer was diagnosed on May 19, 2023, and is grade 1-2, estrogen receptor positive at >95%, progesterone receptor negative, and HER-2/vikas negative.    Oncotype DX Recurrence Score of 14, translating to a 14% recurrence rate with systemic endocrine therapy and no apparent benefit from chemotherapy.     Patient is s/p primary breast surgery. She is awaiting  radiation therapy. She comes in today to discuss adjuvant systemic endocrine treatment options.     Given patient's small tumor size, node-negative disease and hormone receptor positivity, her risk of recurrence is low and is estimated at 14% over a 10 year period, per Oncotype. Therefore I do not recommend adjuvant chemotherapy. She will however,  benefit from systemic endocrine therapy to minimize her risk of recurrence.     The two recommended systemic endocrine therapies, Aromatase Inhibitors and Tamoxifen, were discussed with patient. Side effects that were discussed included but were not limited to osteoporosis, arthritis arthralgia vaginal bleeding VTE, endometrial cancer,  hot flashes, liver toxicity. After much deliberation patient agreed to proceed with Arimidex.      Plan  Proceed with radiation  Start Arimidex 1 mg po daily after radiation  Take Vitamin D and Calcium supplements  Complete DEXA before next visit  RTC 6 months

## 2023-10-24 ENCOUNTER — ANCILLARY PROCEDURE (OUTPATIENT)
Dept: RADIOLOGY | Facility: CLINIC | Age: 53
End: 2023-10-24
Payer: COMMERCIAL

## 2023-10-24 DIAGNOSIS — Z17.0 MALIGNANT NEOPLASM OF UPPER-OUTER QUADRANT OF RIGHT BREAST IN FEMALE, ESTROGEN RECEPTOR POSITIVE (MULTI): ICD-10-CM

## 2023-10-24 DIAGNOSIS — C50.411 MALIGNANT NEOPLASM OF UPPER-OUTER QUADRANT OF RIGHT BREAST IN FEMALE, ESTROGEN RECEPTOR POSITIVE (MULTI): ICD-10-CM

## 2023-10-24 PROCEDURE — 77080 DXA BONE DENSITY AXIAL: CPT

## 2023-10-24 PROCEDURE — 77080 DXA BONE DENSITY AXIAL: CPT | Performed by: RADIOLOGY

## 2024-02-08 ENCOUNTER — APPOINTMENT (OUTPATIENT)
Dept: SURGICAL ONCOLOGY | Facility: CLINIC | Age: 54
End: 2024-02-08

## 2024-04-12 ENCOUNTER — OFFICE VISIT (OUTPATIENT)
Dept: HEMATOLOGY/ONCOLOGY | Facility: CLINIC | Age: 54
End: 2024-04-12
Payer: COMMERCIAL

## 2024-04-12 VITALS
HEART RATE: 79 BPM | DIASTOLIC BLOOD PRESSURE: 89 MMHG | BODY MASS INDEX: 30.33 KG/M2 | SYSTOLIC BLOOD PRESSURE: 171 MMHG | WEIGHT: 170.75 LBS

## 2024-04-12 DIAGNOSIS — C50.411 MALIGNANT NEOPLASM OF UPPER-OUTER QUADRANT OF RIGHT BREAST IN FEMALE, ESTROGEN RECEPTOR POSITIVE (MULTI): ICD-10-CM

## 2024-04-12 DIAGNOSIS — Z17.0 MALIGNANT NEOPLASM OF UPPER-OUTER QUADRANT OF RIGHT BREAST IN FEMALE, ESTROGEN RECEPTOR POSITIVE (MULTI): ICD-10-CM

## 2024-04-12 PROCEDURE — 99214 OFFICE O/P EST MOD 30 MIN: CPT | Performed by: INTERNAL MEDICINE

## 2024-04-12 RX ORDER — ANASTROZOLE 1 MG/1
1 TABLET ORAL DAILY
Qty: 90 TABLET | Refills: 1 | Status: SHIPPED | OUTPATIENT
Start: 2024-04-12 | End: 2024-10-09

## 2024-04-12 ASSESSMENT — PAIN SCALES - GENERAL: PAINLEVEL: 0-NO PAIN

## 2024-04-12 NOTE — PROGRESS NOTES
Patient Visit Information:   Date of Service: 10/20/2023   Referring Provider: Dr. Odalis Richardson  Visit Type: New Visit      Cancer History:          Breast         AJCC Edition: 8th (AJCC), Diagnosis Date: 05/19/2023           ONCOLOGIC HISTORY:     Malignant neoplasm of upper-outer quadrant of right breast in female, estrogen receptor positive (CMS/HCC), Pathologic: Stage IIA (pT1c, pN1a(sn), cM0, G2, ER+, WA-, HER2-, Oncotype DX score: 14)     Treatment Synopsis:       53 y.o. postmenopausal  female with right-sided invasive ductal carcinoma, Stage IIA (pT1c, pN1a(sn), cM0, G2, ER+, WA-, HER2-, Oncotype DX score: 14).  The patient's breast cancer was diagnosed on May 19, 2023, and is grade 1-2, estrogen receptor positive at >95%, progesterone receptor negative, and HER-2/vikas negative.     Oncotype DX Recurrence Score of 14, translating to a 14% recurrence rate with systemic endocrine therapy and no apparent benefit from chemotherapy.      Details of her history are as follows:      05/04/2023: Patient underwent a bilateral diagnostic mammogram and right breast US. This revealed a new mass in the right breast at 10:00, 4         cm from the nipple measuring 8 x 5 x 9 mm. 5 normal looking Lns were seen on the right. Benign coarse calcifications were seen in the left         breast.      05/18/2023: Patient under an US-guided core biopsy of the right breast with results as above     06/16/2023: Patient underwent a bilateral MRI of the breast. This revealed area of biopsy proven malignancy in the right breast. In addition        there  was an additional suspicious area of linear non-mass enhancement in the right breast. MRI-guided biopsy was  recommended. No         abnormality was noted in the left breast.      06/27/2023: Patient underwent an MRI-guided core needle biopsy of the non-mass enhancement in the right breast. Pathology was consistent         with an intra-ductal papilloma.      07/26/2023:  Patient underwent a right partial mastectomy and SLNB. Pathology was consistent with a single focus of invasive ductal carcinoma measuring 1.3 cm, grade 2, with 1/4 SLNs positive for carcinoma with victorino metastasis measuring 0.25mm. Invasive carcinoma was present at superior margin     09/06/2023: Patient underwent re-excision of superior margins of the right breast with no evidence of carcinoma    12/14/2023: Patient completed radiation at [facility]    12/15/2023: Started Arimidex         History of Present Illness: Patient ID: Amber Gaston is a 53 y.o. female.        Chief Complaint:   Here for follow-up.       Interval History:       Ms. Gaston is a pleasant 53 y.o. postmenopausal  female with right-sided invasive ductal carcinoma, Stage IIA (pT1c, pN1a(sn), cM0, G2, ER+, ME-, HER2-, Oncotype DX score: 14).  The patient's breast cancer was diagnosed on May 19, 2023, and is grade 1-2, estrogen receptor positive at >95%, progesterone receptor negative, and HER-2/vikas negative.     Oncotype DX Recurrence Score of 14, translating to a 14% recurrence rate with systemic endocrine therapy and no apparent benefit from chemotherapy.      She reports that since her last visit she has completed radiation and started Arimidex. She is tolerating it well with mild joint pains and hot flashes. She is scheduled for a mammogram on 05/23/2024 and completed a DEXA scan on 10/24/2024 which was normal.        Review of Systems:      Review of Systems - Oncology  A 14-point review of system was completed and was negative except for what is noted in HPI.                 Allergies and Intolerances:       Allergies: No Known Allergies              Outpatient Medication Profile:   Current Medications          Current Outpatient Medications   Medication Sig Dispense Refill    acetaminophen-codeine (Tylenol w/ Codeine #3) 300-30 mg tablet Take 1 tablet by mouth every 4 hours if needed (pain).        chlorhexidine (Peridex)  0.12 % solution          ibuprofen 800 mg tablet Take 1 tablet (800 mg) by mouth 4 times a day. OR EVERY 8 HOURS AS NEEDED FOR PAIN        Stelara injection Stelara 90 MG/ML Subcutaneous Solution Prefilled Syringe  Refills: 0  Start: 14-May-2021  Milliliter        traMADol (Ultram) 50 mg tablet Take 1 tablet (50 mg) by mouth every 6 hours if needed.        Xifaxan 550 mg tablet Take 1 tablet (550 mg) by mouth once daily.          No current facility-administered medications for this visit.                  Medical History:    Medical History        Past Medical History:   Diagnosis Date    Acute vaginitis 2021     Acute vaginitis    Malignant neoplasm of upper-outer quadrant of right breast in female, estrogen receptor positive (CMS/HCC) 10/10/2023         Surgical History:   Surgical History         Past Surgical History:   Procedure Laterality Date    BREAST LUMPECTOMY Left 2023    CHOLECYSTECTOMY Right 2020    KNEE ARTHROSCOPY W/ LATERAL RELEASE Left      SMALL INTESTINE SURGERY Right 10/14/2019    TONSILLECTOMY                     Family History:   Family History          Family History   Problem Relation Name Age of Onset    No Known Problems Mother        Esophageal cancer Father Mario      Brain cancer Father's Brother Jp      Breast cancer Maternal Grandmother Drea      Kidney cancer Paternal Grandfather David      Throat cancer Maternal Great-Grandmother             Family Oncology History:  Cancer-related family history includes Brain cancer in her father's brother; Breast cancer in her maternal grandmother; Esophageal cancer in her father; Kidney cancer in her paternal grandfather.      Breast Cancer Risk Factors:  , Had her menarche at age 12 years,  menopause at age 49 years, she took oral contraceptive pills for 32 years and hormone replacement therapy for 3 years     OBJECTIVE:       Visit Vitals  /89 (BP Location: Left arm, Patient Position: Sitting, BP Cuff Size: Adult)    Pulse 79   Wt 77.4 kg (170 lb 11.9 oz)   BMI 30.33 kg/m²   Smoking Status Former   BSA 1.85 m²      VS / Pain:  There were no vitals taken for this visit.  BSA: There is no height or weight on file to calculate BSA.   Pain Scale: 0     The ECOG performance scale today is Asymptomatic     Physical Exam        Physical Exam:      Constitutional: Well developed, awake/alert/oriented  x3, no distress, alert and cooperative   Eyes: PERRL, EOMI, clear sclera   ENMT: mucous membranes moist, no apparent injury,  no lesions seen   Head/Neck: Neck supple, no apparent injury, thyroid  without mass or tenderness, No JVD, trachea midline, no bruits   Respiratory/Thorax: Patent airways, CTAB, normal  breath sounds with good chest expansion, thorax symmetric   Cardiovascular: Regular, rate and rhythm, no murmurs,  2+ equal pulses of the extremities, normal S 1and S 2   Gastrointestinal: Nondistended, soft, non-tender,  no rebound tenderness or guarding, no masses palpable, no organomegaly, +BS, no bruits   Musculoskeletal: ROM intact, no joint swelling, normal  strength   Extremities: Left upper extremity with hyperpigmentation  tracking along a vein starting in antecubital fossa   Neurological: alert and oriented x3, intact senses,  motor, response and reflexes, normal strength   Breast: s/p rt sided partial mastectomy with well  healed surgical incision. No palpable mass in the left breast. No palpable axillary or supraclavicular lymphadenopathies bilaterally. No swelling of either upper extremity which had free range of motion.   Lymphatic: No significant lymphadenopathy   Psychological: Appropriate mood and behavior   Skin: Warm and dry, no lesions, no rashes       RESULTS:      Lab Results   Component Value Date    HGB 13.8 09/06/2023    HCT 41.5 09/06/2023      Narrative & Impression   MRN: [id number]  Patient Name: NAIMA DAVID     STUDY:  MRI BREAST BILATERAL WITH CONTRAST FULL PROTOCOL;  6/16/2023 10:04 am     ACCESSION  NUMBER(S):  [id number]     ORDERING CLINICIAN:  SANGEETHA COBURN     INDICATION:  Patient has a history of recently diagnosed right breast IDC.     COMPARISON:  Mammogram 10/22/2018 and 05/04/2023     TECHNIQUE:  Using a dedicated breast coil, STIR axial and T1-weighted fat  saturation axial images of the breasts were obtained, the latter both  before and after intravenous administration of Gadolinium DTPA. On an  independent workstation, 3-D images were formulated using [employer]  including time enhancement curves, subtraction images and MIP images.     Intravenous contrast:  15 milliliter of DOTAREM GADOTERATE MEGLUMINE  INJECTION     FINDINGS:  There is  symmetric  minimal bilateral background enhancement.     There is scattered fibroglandular tissue.     RIGHT BREAST:  A 0.6 x 0.6 enhancing mass is noted in upper outer  right breast at mid depth corresponding to the biopsy-proven  malignancy (subtraction image 97/216). Susceptibility artifact within  this focus corresponds to the biopsy clip. No evidence of dermal,  pectoralis, chest wall, or nipple invasion identified. There is also  linear non mass enhancement in the slightly superior central breast  measuring up to 1.2 cm (subtraction image 116/216). This is also  better appreciated on a saved sagittal reconstruction image. There is  approximately 2.1 cm between this finding and the biopsy proven  malignancy.     No axillary or internal mammary lymphadenopathy is appreciated.     LEFT BREAST:  No suspicious mass or nonmass enhancement is identified.     No axillary or internal mammary lymphadenopathy is appreciated.     NON-BREAST FINDINGS:  None.     IMPRESSION:  1. Enhancing mass in the upper outer right breast, consistent with  biopsy-proven malignancy. No evidence of extension to the chest wall,  pectoralis, dermal, or nipple invasion. No suspicious axillary  lymphadenopathy.  2. There is an additional suspicious area of linear nonmass  enhancement in the right  breast. MRI-guided biopsy is recommended.  3. No MRI evidence of malignancy in the left breast.     A preprocedure form was completed. A critical alert notification was  sent to the ordering provider.     BI-RADS CATEGORY:     Category: 4 - Suspicious.  Recommendation: Biopsy Recommended.               Assessment and Plan:   Assessment     Ms. Gaston is a pleasant 53 y.o. postmenopausal  female with right-sided invasive ductal carcinoma, Stage IIA (pT1c, pN1a(sn), cM0, G2, ER+, VT-, HER2-, Oncotype DX score: 14).  The patient's breast cancer was diagnosed on May 19, 2023, and is grade 1-2, estrogen receptor positive at >95%, progesterone receptor negative, and HER-2/vikas negative.     Oncotype DX Recurrence Score of 14, translating to a 14% recurrence rate with systemic endocrine therapy and no apparent benefit from chemotherapy.      Patient is s/p primary breast surgery, radiation and currently on hormonal therapy and tolerating it well. No evidence of recurrence.        Plan  Continue Arimidex 1 mg po daily after radiation  Take Vitamin D and Calcium supplements  Bilateral mammogram is scheduled for 5/23/2024  RTC 6 months

## 2024-04-26 PROCEDURE — 88342 IMHCHEM/IMCYTCHM 1ST ANTB: CPT | Performed by: PATHOLOGY

## 2024-04-26 PROCEDURE — 88305 TISSUE EXAM BY PATHOLOGIST: CPT | Performed by: PATHOLOGY

## 2024-04-26 PROCEDURE — 88342 IMHCHEM/IMCYTCHM 1ST ANTB: CPT

## 2024-04-26 PROCEDURE — 88305 TISSUE EXAM BY PATHOLOGIST: CPT

## 2024-04-30 ENCOUNTER — LAB REQUISITION (OUTPATIENT)
Dept: LAB | Facility: HOSPITAL | Age: 54
End: 2024-04-30
Payer: COMMERCIAL

## 2024-05-07 LAB
LAB AP ASR DISCLAIMER: NORMAL
LABORATORY COMMENT REPORT: NORMAL
PATH REPORT.ADDENDUM SPEC: NORMAL
PATH REPORT.COMMENTS IMP SPEC: NORMAL
PATH REPORT.FINAL DX SPEC: NORMAL
PATH REPORT.GROSS SPEC: NORMAL
PATH REPORT.RELEVANT HX SPEC: NORMAL
PATH REPORT.TOTAL CANCER: NORMAL

## 2024-05-07 NOTE — PROGRESS NOTES
Amber Gaston female   1970 53 y.o.   [id number]      Chief Complaint  Annual mammogram and exam, history of right breast cancer    History Of Present Illness  Amber Gaston is a pleasant 53 year old female s/p right Magseed localized partial mastectomy and sentinel lymph node biopsy on 7/26/23 yielding invasive ductal carcinoma, 1.3 cm, grade 2, positive superior margin, 1/4 LNs with micromets; ER+95%, PA-, HER2. 9/15/23 she returned to the OR for right breast re-excision partial mastectomy for margins, right breast local tissue rearrangement. Final margins negative. She completed post-operative radiation with [facility] on 12/14/2023. She started Anastrozole on 12/15/2023, currently taking and tolerating well with a 5-10 year plan. She presents today for annual mammogram and exam. She denies any new masses or lumps.   Stage IB [id number]    REPRODUCTIVE HISTORY: menarche age 12, nulliparous, OCP's x 20 years, natural menopause age 49, HRT x 3 years, heterogeneously dense tissue    FAMILY CANCER HISTORY:   Maternal Grandmother: Breast cancer, age 70  Paternal Grandfather: Renal cancer, age 65  Father: Esophageal cancer, age 77     Surgical History  She has a past surgical history that includes Breast lumpectomy (Left, 07/26/2023); Tonsillectomy; Knee arthroscopy w/ lateral release (Left); Small intestine surgery (Right, 10/14/2019); Cholecystectomy (Right, 01/17/2020); and Breast biopsy.     Social History  She reports that she quit smoking about 5 years ago. Her smoking use included cigarettes. She started smoking about 30 years ago. She has a 6.3 pack-year smoking history. She has never used smokeless tobacco. She reports current alcohol use of about 2.0 standard drinks of alcohol per week. She reports that she does not use drugs.    Family History  Family History   Problem Relation Name Age of Onset    No Known Problems Mother      Esophageal cancer Father Mario     Breast cancer Maternal Grandmother  Drea 70    Kidney cancer Paternal Grandfather David     Brain cancer Father's Brother Jp     Throat cancer Maternal Great-Grandmother          Allergies  Patient has no known allergies.    Medications  Current Outpatient Medications   Medication Instructions    anastrozole (ARIMIDEX) 1 mg, oral, Daily, Swallow whole with a drink of water.    Stelara injection Stelara 90 MG/ML Subcutaneous Solution Prefilled Syringe  Refills: 0  Start: 14-May-2021  Milliliter         REVIEW OF SYSTEMS    Constitutional:  Negative for appetite change, fatigue, fever and unexpected weight change.   HENT:  Negative for ear pain, hearing loss, nosebleeds, sore throat and trouble swallowing.    Eyes:  Negative for discharge, itching and visual disturbance.   Respiratory:  Negative for cough, chest tightness and shortness of breath.    Cardiovascular:  Negative for chest pain, palpitations and leg swelling.   Breast: as indicated in HPI  Gastrointestinal:  Negative for abdominal pain, constipation, diarrhea and nausea.   Endocrine: Negative for cold intolerance and heat intolerance.   Genitourinary:  Negative for dysuria, frequency, hematuria, pelvic pain and vaginal bleeding.   Musculoskeletal:  Negative for arthralgias, back pain, gait problem, joint swelling and myalgias.   Skin:  Negative for color change and rash.   Allergic/Immunologic: Negative for environmental allergies and food allergies.   Neurological:  Negative for dizziness, tremors, speech difficulty, weakness, numbness and headaches.   Hematological:  Does not bruise/bleed easily.   Psychiatric/Behavioral:  Negative for agitation, dysphoric mood and sleep disturbance. The patient is not nervous/anxious.         Past Medical History  She has a past medical history of Acute vaginitis (04/22/2021), Malignant neoplasm of upper-outer quadrant of right breast in female, estrogen receptor positive (Multi) (10/10/2023), Personal history of irradiation, and Usual hyperplasia of  lactiferous duct.     Physical Exam  Patient is alert and oriented x3 and in a relaxed and appropriate mood. Her gait is steady and hand grasps are equal. Sclera is clear. The breasts are slightly asymmetrical, left larger. The left breast tissue is soft without palpable abnormalities, discrete nodules or masses. The right breast has a well-healed partial circumareolar incision and a well-healed right axillary incision. The right nipple areolar complex has mild thickening and moderate hyperpigmentation consistent with post-operative radiation changes. The right breast tissue is thickened secondary to radiation, but without palpable abnormalities, discrete nodules or masses. The left breast skin and nipple appear normal. There is no cervical, supraclavicular or axillary lymphadenopathy. Heart rate and rhythm normal, S1 and S2 appreciated. The lungs are clear to auscultation bilaterally. Abdomen is soft and non-tender.       Physical Exam  Chest:              Last Recorded Vitals  Vitals:    05/23/24 1053   BP: 136/84   Pulse: 72   Temp: 36.2 °C (97.2 °F)   SpO2: 97%       Relevant Results   Time was spent viewing digital images of the radiology testing with the patient. I explained the results in depth, along with suggested explanation for follow up recommendations based on the testing results. BI-RADS Category 2    Imaging  Narrative & Impression   Interpreted By:  Andrew Hickey,   STUDY:  BI MAMMO BILATERAL DIAGNOSTIC TOMOSYNTHESIS;  5/23/2024 10:51 am      ACCESSION NUMBER(S):  [id number]      ORDERING CLINICIAN:  SANGEETHA COBURN      INDICATION:  Initial mammogram status post right lumpectomy with radiation  therapy. Benign right breast biopsy. Bilateral annual.      COMPARISON:  05/04/2023 and 12/14/2017      FINDINGS:  MAMMOGRAPHY: 2D and tomosynthesis images were reviewed at 1 mm slice  thickness.      Density:  The breast tissue is heterogeneously dense, which may  obscure small masses.      New  postsurgical scarring with associated surgical clips is seen in  the superolateral right breast extending from anterior to middle  depth and overlying the right axilla. New right breast  skin/trabecular thickening consistent with post radiation changes. An  asymmetry seen in the medial left breast on the CC view at posterior  depth that resolves into normal fibroglandular tissue on additional  imaging. No suspicious masses or calcifications are identified  bilaterally.      IMPRESSION:  No mammographic evidence of malignancy.      BI-RADS CATEGORY:      BI-RADS Category:  2 Benign.  Recommendation:  Annual Screening.  Recommended Date:  1 Year.  Laterality:  Bilateral.       Assessment/Plan   Normal clinical exam and imaging, history of right breast cancer, family history of breast cancer, Anastrozole therapy, heterogeneously dense tissue    Plan: Return in one year for bilateral screening mammogram and office visit. Continue Anastrozole 1mg daily.    Patient Discussion/Summary  Your clinical examination and imaging are normal. Please return in one year for bilateral screening mammogram and office visit or sooner if you have any problems or concerns. Continue Anastrozole 1mg daily.     You can see your health information, review clinical summaries from office visits & test results online when you follow your health with MY  Chart, a personal health record. To sign up go to [email or web address]. If you need assistance with signing up or trouble getting into your account call [employer] Patient Line 24/7 at [phone].    My office phone number is [phone] if you need to get in touch with me or have additional questions or concerns. Thank you for choosing [facility] and trusting me as your healthcare provider. I look forward to seeing you again at your next office visit. I am honored to be a provider on your health care team and I remain dedicated to helping you achieve your health  goals.      Queenie Monroe, APRN-CNP

## 2024-05-23 ENCOUNTER — OFFICE VISIT (OUTPATIENT)
Dept: SURGICAL ONCOLOGY | Facility: CLINIC | Age: 54
End: 2024-05-23
Payer: COMMERCIAL

## 2024-05-23 ENCOUNTER — APPOINTMENT (OUTPATIENT)
Dept: SURGICAL ONCOLOGY | Facility: CLINIC | Age: 54
End: 2024-05-23
Payer: COMMERCIAL

## 2024-05-23 ENCOUNTER — HOSPITAL ENCOUNTER (OUTPATIENT)
Dept: RADIOLOGY | Facility: CLINIC | Age: 54
Discharge: HOME | End: 2024-05-23
Payer: COMMERCIAL

## 2024-05-23 ENCOUNTER — APPOINTMENT (OUTPATIENT)
Dept: RADIOLOGY | Facility: CLINIC | Age: 54
End: 2024-05-23
Payer: COMMERCIAL

## 2024-05-23 VITALS — HEIGHT: 63 IN | WEIGHT: 170.64 LBS | BODY MASS INDEX: 30.23 KG/M2

## 2024-05-23 VITALS
TEMPERATURE: 97.2 F | OXYGEN SATURATION: 97 % | SYSTOLIC BLOOD PRESSURE: 136 MMHG | DIASTOLIC BLOOD PRESSURE: 84 MMHG | WEIGHT: 168 LBS | HEART RATE: 72 BPM | BODY MASS INDEX: 29.84 KG/M2

## 2024-05-23 DIAGNOSIS — Z79.811 USE OF ANASTROZOLE: ICD-10-CM

## 2024-05-23 DIAGNOSIS — Z85.3 ENCOUNTER FOR FOLLOW-UP SURVEILLANCE OF BREAST CANCER: Primary | ICD-10-CM

## 2024-05-23 DIAGNOSIS — C50.911 MALIGNANT NEOPLASM OF UNSPECIFIED SITE OF RIGHT FEMALE BREAST (MULTI): ICD-10-CM

## 2024-05-23 DIAGNOSIS — Z08 ENCOUNTER FOR FOLLOW-UP SURVEILLANCE OF BREAST CANCER: Primary | ICD-10-CM

## 2024-05-23 PROCEDURE — 1036F TOBACCO NON-USER: CPT | Performed by: NURSE PRACTITIONER

## 2024-05-23 PROCEDURE — 77066 DX MAMMO INCL CAD BI: CPT | Performed by: STUDENT IN AN ORGANIZED HEALTH CARE EDUCATION/TRAINING PROGRAM

## 2024-05-23 PROCEDURE — 77062 BREAST TOMOSYNTHESIS BI: CPT

## 2024-05-23 PROCEDURE — 99213 OFFICE O/P EST LOW 20 MIN: CPT | Performed by: NURSE PRACTITIONER

## 2024-05-23 PROCEDURE — 77062 BREAST TOMOSYNTHESIS BI: CPT | Performed by: STUDENT IN AN ORGANIZED HEALTH CARE EDUCATION/TRAINING PROGRAM

## 2024-05-23 ASSESSMENT — PAIN SCALES - GENERAL: PAINLEVEL: 3

## 2024-05-23 NOTE — PATIENT INSTRUCTIONS
Your clinical examination and imaging are normal. Please return in one year for bilateral screening mammogram and office visit or sooner if you have any problems or concerns. Continue Anastrozole 1mg daily.     You can see your health information, review clinical summaries from office visits & test results online when you follow your health with MY  Chart, a personal health record. To sign up go to [email or web address]. If you need assistance with signing up or trouble getting into your account call [employer] Patient Line 24/7 at [phone].    My office phone number is [phone] if you need to get in touch with me or have additional questions or concerns. Thank you for choosing [facility] and trusting me as your healthcare provider. I look forward to seeing you again at your next office visit. I am honored to be a provider on your health care team and I remain dedicated to helping you achieve your health goals.

## 2024-06-05 PROBLEM — G89.18 POST-OP PAIN: Status: RESOLVED | Noted: 2019-10-16 | Resolved: 2024-06-05

## 2024-06-05 PROBLEM — D47.3 THROMBOCYTHEMIA, ESSENTIAL (MULTI): Status: RESOLVED | Noted: 2019-06-21 | Resolved: 2024-06-05

## 2024-06-05 PROBLEM — R53.83 FATIGUE: Status: RESOLVED | Noted: 2019-05-30 | Resolved: 2024-06-05

## 2024-06-05 PROBLEM — K50.819 CROHN'S DISEASE OF BOTH SMALL AND LARGE INTESTINE WITH COMPLICATION (MULTI): Status: ACTIVE | Noted: 2019-06-21

## 2024-06-05 PROBLEM — R10.84 GENERALIZED ABDOMINAL PAIN: Status: RESOLVED | Noted: 2019-05-30 | Resolved: 2024-06-05

## 2024-06-05 PROBLEM — E66.811 OBESITY, CLASS I, BMI 30-34.9: Status: RESOLVED | Noted: 2019-10-21 | Resolved: 2024-06-05

## 2024-06-05 PROBLEM — E66.9 OBESITY, CLASS I, BMI 30-34.9: Status: RESOLVED | Noted: 2019-10-21 | Resolved: 2024-06-05

## 2024-06-05 PROBLEM — Z98.890 POST-OPERATIVE STATE: Status: RESOLVED | Noted: 2019-10-14 | Resolved: 2024-06-05

## 2024-06-07 ENCOUNTER — OFFICE VISIT (OUTPATIENT)
Dept: OBSTETRICS AND GYNECOLOGY | Facility: CLINIC | Age: 54
End: 2024-06-07
Payer: COMMERCIAL

## 2024-06-07 VITALS
DIASTOLIC BLOOD PRESSURE: 70 MMHG | HEIGHT: 63 IN | SYSTOLIC BLOOD PRESSURE: 110 MMHG | BODY MASS INDEX: 30.12 KG/M2 | WEIGHT: 170 LBS

## 2024-06-07 DIAGNOSIS — Z01.419 WELL WOMAN EXAM WITH ROUTINE GYNECOLOGICAL EXAM: Primary | ICD-10-CM

## 2024-06-07 PROCEDURE — 87624 HPV HI-RISK TYP POOLED RSLT: CPT

## 2024-06-07 PROCEDURE — 99396 PREV VISIT EST AGE 40-64: CPT | Performed by: OBSTETRICS & GYNECOLOGY

## 2024-06-07 PROCEDURE — 1036F TOBACCO NON-USER: CPT | Performed by: OBSTETRICS & GYNECOLOGY

## 2024-06-07 RX ORDER — ACETAMINOPHEN 500 MG
5000 TABLET ORAL
COMMUNITY

## 2024-06-07 RX ORDER — CALCIUM CARBONATE 600 MG
TABLET ORAL
COMMUNITY

## 2024-06-07 NOTE — PROGRESS NOTES
Chief Complaint   Patient presents with    Annual Exam     Annual Exam with Pap Smear    G0  H/O breast cancer    No complaints.  Mammogram and breast exam completed recently by surgical oncologist; wnl.    Chaperone declined.     Patient diagnosed with breast cancer last year.  Status post right breast lumpectomy, reexcision followed by radiation.  Is currently on anastrozole and has noticed that her hot flashes and night sweats have increased significantly.  Discussed Unisom for sleep.    REVIEW OF SYSTEMS    Please see HPI for reported pertinent positives, which would supersede this ROS    Constitutional:  Denies fever, chills, wt gain or loss, fatigue    Genito-Urinary:  Denies genital lesion or sores, vaginal dryness, itching  or pain.  No abnormal vaginal discharge or unexplained vaginal bleeding.  No dysuria, urinary incontinence or frequency.  Denies pelvic pain, dysmenorrhea or dyspareunia.    Eyes:  Denies vision changes, dryness  ENT:  No hearing loss, sinus pain or congestion, nosebleeds  Cardiovascular:  No chest pain or palpitations  Respiratory:  No SOB, cough, wheezing  GI:  No Nausea, vomiting, diarrhea, constipation, abdominal pain  Musculoskeletal:  No new back pain. joint pain, peripheral edema  Skin:  No rash or skin lesion  Neurologic:  No HA, numbness or dizziness  Psychiatric:  No new anxiety or depression  Endocrine:  No loss of hair or hirsutism  Hematologic/lymphatic:  No swollen lymph nodes.  No reported tendency for easy bruising or bleeding    Patient admits to no other systemic complaints      PHYSICAL EXAM:    PSYCH:  Pt is alert, oriented and cooperative    SKIN: warm, dry, w/o lesion    HEAD AND FACE:  External inspection of eyes, ears, functional cranial nerves normal and intact    THYROID:  No thyromegaly    CARDIOVASCULAR:  Warm and well Perfused    PULMONARY:  No respiratory distress    ABDOMEN:  soft, nontender.  No mass or organomegaly.        PELVIC:    External genitalia,  urethra, perianal region normal to inspection and palpation if indicated.  No inguinal LA    Vagina without lesions.    Cervix seen and visually normal      Bimanual exam:      No pelvic mass palpable    Uterus nontender, midline in pelvis    No adnexal masses or tenderness    Assessment/Plan    Diagnoses and all orders for this visit:  Well woman exam with routine gynecological exam  -     THINPREP PAP TEST

## 2024-08-13 ENCOUNTER — APPOINTMENT (OUTPATIENT)
Dept: RADIOLOGY | Facility: CLINIC | Age: 54
End: 2024-08-13

## 2024-09-23 ENCOUNTER — TELEPHONE (OUTPATIENT)
Dept: HEMATOLOGY/ONCOLOGY | Facility: CLINIC | Age: 54
End: 2024-09-23
Payer: COMMERCIAL

## 2024-09-24 DIAGNOSIS — C50.411 MALIGNANT NEOPLASM OF UPPER-OUTER QUADRANT OF RIGHT BREAST IN FEMALE, ESTROGEN RECEPTOR POSITIVE: ICD-10-CM

## 2024-09-24 DIAGNOSIS — Z17.0 MALIGNANT NEOPLASM OF UPPER-OUTER QUADRANT OF RIGHT BREAST IN FEMALE, ESTROGEN RECEPTOR POSITIVE: ICD-10-CM

## 2024-09-24 RX ORDER — ANASTROZOLE 1 MG/1
1 TABLET ORAL DAILY
Qty: 90 TABLET | Refills: 1 | Status: SHIPPED | OUTPATIENT
Start: 2024-09-24 | End: 2025-03-23

## 2024-10-10 NOTE — PROGRESS NOTES
Patient Visit Information:   Patient name: Amber Gaston  : 1970  Date of Service: 10/11/2024    Visit Type: Follow-up      Cancer History:          Breast         AJCC Edition: 8th (AJCC), Diagnosis Date:           Cancer Staging   Malignant neoplasm of upper-outer quadrant of right breast in female, estrogen receptor positive, Staging form: Breast, AJCC 8th Edition, Pathologic stage from 10/10/2023: Stage IIA (pT1c, pN1a(sn), cM0, G2, ER+, [place]-, HER2-, Oncotype DX score: 14) - Signed by Daniella Tovar MD on 10/10/2023     Treatment Synopsis:       Amber Gaston is a 54 y.o. postmenopausal  female with right-sided invasive ductal carcinoma, Stage IIA (pT1c, pN1a(sn), cM0, G2, ER+, [place]-, HER2-, Oncotype DX score: 14). The patient's breast cancer was diagnosed on May 19, 2023, and is grade 1-2, estrogen receptor positive at >95%, progesterone receptor negative, and HER-2/vikas negative. Oncotype DX Recurrence Score of 14, translating to a 14% recurrence rate with systemic endocrine therapy and no apparent benefit from chemotherapy.      CURRENT THERAPY: Arimidex      ONCOLOGIC HISTORY:  Details of her breast cancer history are as follows:     2023: Patient underwent a bilateral diagnostic mammogram and right breast US. This revealed a new mass in the right breast at 10:00, 4 cm from the nipple measuring 8 x 5 x 9 mm. 5 normal looking Lns were seen on the right. Benign coarse calcifications were seen in the left breast.   2023: Patient under an US-guided core biopsy of the right breast with results as above   2023: Patient underwent a bilateral MRI of the breast. This revealed area of biopsy proven malignancy in the right breast. In addition there was an additional suspicious area of linear non-mass enhancement in the right breast. MRI-guided biopsy was  recommended. No abnormality was noted in the left breast.  2023: Patient underwent an MRI-guided core needle biopsy of the  non-mass enhancement in the right breast. 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   07/26/2023: Patient underwent a right partial mastectomy and SLNB. Pathology was consistent with a single focus of invasive ductal carcinoma measuring 1.3 cm, grade 2, with 1/4 SLNs positive for carcinoma with victorino metastasis measuring 0.25mm. Invasive carcinoma was present at superior margin  09/06/2023: Patient underwent re-excision of superior margins of the right breast with no evidence of carcinoma  12/14/2023: Patient completed radiation at [facility]  12/15/2023: Started Arimidex   4/12/2024: Last seen by Dr. Tracey        History of Present Illness: Patient ID:  Amber Gaston is a 54 y.o. female.     Chief Complaint and/or reason for visit: Here for a follow-up     Interval History:    Amber Gaston is a pleasant  54 y.o. woman with history of right sided IDC.  Here for establishing care with a new provider.     She reports that she has been experiencing bilateral hip pain over the last 4-5 months.   Worse after she sits in the car going to work.     Other than hot flashes, she thinks she is doing well with AI.     She denies fever, chills, Wt loss, headaches, CP, SOB, N/V, abdominal pain, constipation, diarrhea, neuropathy, joint pain, extremity swelling, rashes. Appetite is good and she is drinking fine.       Review of Systems:   A 14-point review of system was completed and was negative except for what is noted in HPI.      Allergies and Intolerances:       Allergies:   No Known Allergies          Outpatient Medication Profile:   Current Outpatient Medications on File Prior to Visit   Medication Sig Dispense Refill    anastrozole (Arimidex) 1 mg tablet Take 1 tablet (1 mg total) by mouth once daily.  Swallow whole with a drink of water. 90 tablet 1    calcium carbonate 600 mg calcium (1,500 mg) tablet 1 tablet Orally 1 x per day      cholecalciferol (Vitamin D-3) 5,000 Units tablet Take 1 tablet  (5,000 Units) by mouth once daily.      Stelara injection Stelara 90 MG/ML Subcutaneous Solution Prefilled Syringe  Refills: 0  Start: 14-May-2021  Milliliter       No current facility-administered medications on file prior to visit.          Medical History:    Past Medical History:   Diagnosis Date    Abnormal Pap smear of cervix     Acute vaginitis 04/22/2021    Acute vaginitis    Colitis 11/10/2016    Cysts of both ovaries 11/09/2016    Fatigue 05/30/2019    Formatting of this note might be different from the original.   Reports fatigue. Does not rest well at night. Cherelle Yu RN 05/30/19       Last Assessment & Plan:    Formatting of this note might be different from the original.   Not addressed during this call.   Heather Thurman RN BSN 11/25/2019    Generalized abdominal pain 05/30/2019    Formatting of this note might be different from the original.   Reports abdominal cramping and day to day changes in bowel movements. Feels it may be diet related. Cherelle Yu RN 05/30/19       Last Assessment & Plan:    Formatting of this note might be different from the original.   · Bowel resection surgery on 10/14/19, was in the hospital for 7 days but member is recovering well.       HPV (human papilloma virus) infection     Kidney stone 11/2023    Malignant neoplasm of upper-outer quadrant of right breast in female, estrogen receptor positive (Multi) 10/10/2023    Obesity, Class I, BMI 30-34.9 10/21/2019    Personal history of irradiation     Post-op pain 10/16/2019    Last Assessment & Plan:    Formatting of this note might be different from the original.   Multimodal pain regimen    Post-operative state 10/14/2019    Last Assessment & Plan:    Formatting of this note might be different from the original.   Advance diet to clears -- will advance to GI soft this PM if tolerating clears    Pain control   Encourage ambulation and IS use    Thrombocythemia, essential (Multi) 06/21/2019     Usual hyperplasia of lactiferous duct        Surgical History:   Past Surgical History:   Procedure Laterality Date    APPENDECTOMY  10/2019    BREAST BIOPSY      BREAST LUMPECTOMY Left 2023    CERVICAL BIOPSY  W/ LOOP ELECTRODE EXCISION      CHOLECYSTECTOMY Right 2020    KNEE ARTHROSCOPY W/ LATERAL RELEASE Left     SMALL INTESTINE SURGERY Right 10/14/2019    TONSILLECTOMY         Social Substance History:   Social History     Tobacco Use    Smoking status: Former     Current packs/day: 0.00     Average packs/day: 0.3 packs/day for 25.0 years (6.3 ttl pk-yrs)     Types: Cigarettes     Start date: 1994     Quit date:      Years since quittin.7    Smokeless tobacco: Never   Substance Use Topics    Alcohol use: Yes     Alcohol/week: 2.0 standard drinks of alcohol     Types: 2 Glasses of wine per week     Social History     Substance and Sexual Activity   Drug Use Never       Family History:   Family History   Problem Relation Name Age of Onset    No Known Problems Mother      Esophageal cancer Father Mario     Cancer Father Mario     Hernia Father Mario     Hypertension Father Mario     Breast cancer Maternal Grandmother Drea 70    Kidney cancer Paternal Grandfather David     Cancer Paternal Grandfather David     Brain cancer Father's Brother Jp     Cancer Father's Brother Jp     Throat cancer Maternal Great-Grandmother      Heart disease Maternal Grandfather Tony         OBJECTIVE:     Visit Vitals  Heart Rate:  [70] 70  BP: (137)/(90) 137/90    Pain Scale: 0 (in clinic)       The ECOG performance scale today is Asymptomatic/0       Physical Exam:      Constitutional: Well developed, awake/alert/oriented  x3, no distress, alert and cooperative   Eyes: PERRL, EOMI, clear sclera   ENMT: mucous membranes moist, no apparent injury,  no lesions seen   Head/Neck: Neck supple, no apparent injury, thyroid  without mass or tenderness, No JVD, trachea midline, no bruits   Respiratory/Thorax: Patent  "airways, CTAB, normal  breath sounds with good chest expansion, thorax symmetric   Cardiovascular: Regular, rate and rhythm, no murmurs,  2+ equal pulses of the extremities, normal S 1and S 2   Gastrointestinal: Nondistended, soft, non-tender,  no rebound tenderness or guarding, no masses palpable, no organomegaly, +BS, no bruits   Musculoskeletal: ROM intact, no joint swelling, normal  strength   Extremities: No LE edema   Neurological: alert and oriented x3, intact senses,  motor, response and reflexes, normal strength   Breast: s/p rt sided partial mastectomy with well healed surgical incision. No palpable mass in either breast. No palpable axillary or supraclavicular lymphadenopathies bilaterally. No swelling of either upper extremity which had free range of motion.   Lymphatic: No significant lymphadenopathy   Psychological: Appropriate mood and behavior   Skin: Warm and dry, no lesions, no rashes          LAB RESULTS    Lab Results   Component Value Date    HGB 13.8 09/06/2023    HCT 41.5 09/06/2023     No results found for: \"NEUTROABS\"    No results for input(s): \"CREATININE\", \"BUN\", \"NA\", \"K\", \"CL\", \"CO2\" in the last 72 hours.  No components found for: \"CALCGFR\"  No results found for: \"GLUCOSE\"  No results found for: \"NA\", \"K\", \"CL\", \"CO2\", \"BUN\", \"CREATININE\", \"ALT\", \"AST\", \"GGT\", \"ALKPHOS\", \"BILITOT\"  No results found for: \"FOLATE\"  No results found for: \"[id number]\"  No results found for: \"TSH\"     Imaging:     No images are attached to the encounter.        Assessment and Plan:   Assessment     Amber Gaston is a 54 y.o. postmenopausal  female with right-sided invasive ductal carcinoma, Stage IIA (pT1c, pN1a(sn), cM0, G2, ER+, [place]-, HER2-, Oncotype DX score: 14). The patient's breast cancer was diagnosed on May 19, 2023, and is grade 1-2, estrogen receptor positive at >95%, progesterone receptor negative, and HER-2/vikas negative. Oncotype DX Recurrence Score of 14, translating to a 14% recurrence " rate with systemic endocrine therapy and no apparent benefit from chemotherapy.     Patient is s/p primary breast surgery, radiation and currently on hormonal therapy and tolerating it well. No evidence of recurrence.      Mammogram on 05/23/2024: Benign (category 2)     DEXA scan on 10/24/2023 which was normal.      Plan     Holding off arimidex for 2 weeks to see if her joint pain improves  If improvement, will switch to examestane/  If no improvement, will order bone scan.   Take Vitamin D and Calcium supplements  Bilateral mammogram - Next due in 05/2025  Take Vitamin D and Calcium supplements  Will obtain DEXA in 2025 - Insurance covers screening DEXA q2y  Consider Adjuvant Zometa to de discussed if DEXA shows osteopenia/osteoporosis  MD visit in 2 weeks   RTC on 10/25/2024 (Virtual visit)       Dannielle Alvarez MD, PhD   Hematology/Oncology  [facility]

## 2024-10-11 ENCOUNTER — OFFICE VISIT (OUTPATIENT)
Dept: HEMATOLOGY/ONCOLOGY | Facility: CLINIC | Age: 54
End: 2024-10-11
Payer: COMMERCIAL

## 2024-10-11 ENCOUNTER — APPOINTMENT (OUTPATIENT)
Dept: HEMATOLOGY/ONCOLOGY | Facility: CLINIC | Age: 54
End: 2024-10-11
Payer: COMMERCIAL

## 2024-10-11 VITALS
SYSTOLIC BLOOD PRESSURE: 137 MMHG | HEART RATE: 70 BPM | DIASTOLIC BLOOD PRESSURE: 90 MMHG | WEIGHT: 170.75 LBS | BODY MASS INDEX: 30.25 KG/M2

## 2024-10-11 DIAGNOSIS — Z17.0 MALIGNANT NEOPLASM OF UPPER-OUTER QUADRANT OF RIGHT BREAST IN FEMALE, ESTROGEN RECEPTOR POSITIVE: ICD-10-CM

## 2024-10-11 DIAGNOSIS — C50.411 MALIGNANT NEOPLASM OF UPPER-OUTER QUADRANT OF RIGHT BREAST IN FEMALE, ESTROGEN RECEPTOR POSITIVE: ICD-10-CM

## 2024-10-11 PROCEDURE — 99214 OFFICE O/P EST MOD 30 MIN: CPT | Performed by: INTERNAL MEDICINE

## 2024-10-11 ASSESSMENT — PAIN SCALES - GENERAL: PAINLEVEL: 0-NO PAIN

## 2024-10-11 NOTE — PATIENT INSTRUCTIONS
Please hold anastrozole for 2 weeks.  Please schedule virtual visit with Dr. Alvarez on 10/25.  Please call [phone] (option 5, then option 2) with symptoms, questions or concerns.

## 2024-10-23 NOTE — PROGRESS NOTES
Patient Visit Information:   Patient name: Amber Gaston  : 1970  Date of Service: 10/25/2024    Visit Type: Follow-up      Cancer History:          Breast         AJCC Edition: 8th (AJCC), Diagnosis Date:           Cancer Staging   Malignant neoplasm of upper-outer quadrant of right breast in female, estrogen receptor positive, Staging form: Breast, AJCC 8th Edition, Pathologic stage from 10/10/2023: Stage IIA (pT1c, pN1a(sn), cM0, G2, ER+, [place]-, HER2-, Oncotype DX score: 14) - Signed by Daniella Tovar MD on 10/10/2023     Treatment Synopsis:       Amber Gaston is a 54 y.o. postmenopausal  female with right-sided invasive ductal carcinoma, Stage IIA (pT1c, pN1a(sn), cM0, G2, ER+, [place]-, HER2-, Oncotype DX score: 14). The patient's breast cancer was diagnosed on May 19, 2023, and is grade 1-2, estrogen receptor positive at >95%, progesterone receptor negative, and HER-2/vikas negative. Oncotype DX Recurrence Score of 14, translating to a 14% recurrence rate with systemic endocrine therapy and no apparent benefit from chemotherapy.      CURRENT THERAPY: Arimidex      ONCOLOGIC HISTORY:  Details of her breast cancer history are as follows:     2023: Patient underwent a bilateral diagnostic mammogram and right breast US. This revealed a new mass in the right breast at 10:00, 4 cm from the nipple measuring 8 x 5 x 9 mm. 5 normal looking Lns were seen on the right. Benign coarse calcifications were seen in the left breast.   2023: Patient under an US-guided core biopsy of the right breast with results as above   2023: Patient underwent a bilateral MRI of the breast. This revealed area of biopsy proven malignancy in the right breast. In addition there was an additional suspicious area of linear non-mass enhancement in the right breast. MRI-guided biopsy was  recommended. No abnormality was noted in the left breast.  2023: Patient underwent an MRI-guided core needle biopsy of the  non-mass enhancement in the right breast. 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   07/26/2023: Patient underwent a right partial mastectomy and SLNB. Pathology was consistent with a single focus of invasive ductal carcinoma measuring 1.3 cm, grade 2, with 1/4 SLNs positive for carcinoma with victorino metastasis measuring 0.25mm. Invasive carcinoma was present at superior margin  09/06/2023: Patient underwent re-excision of superior margins of the right breast with no evidence of carcinoma  12/14/2023: Patient completed radiation at [facility]  12/15/2023: Started Arimidex   4/12/2024: Last seen by Dr. Tracey  10/11/2024: Discontinued Arimidex due to side effects (pain)        History of Present Illness: Patient ID:  Amber Gaston is a 54 y.o. female.     Chief Complaint and/or reason for visit: Here for a follow-up     Interval History:    Amber Gaston is a pleasant  54 y.o. woman with history of right sided IDC.  Here for establishing care with a new provider.     She agreed to be seen by me virtually for a follow-up.   Patient confirmed that she is in the [facility] at the time of the virtual visit.   She agreed to be seen by me virtually for a follow-up.     An interactive audio and video telecommunication system which permits real time communications between the patient (at the originating site) and provider (at the distant site) was utilized to provide this telehealth service.      She reported bilateral hip pain over the last 4-5 months. She held anastrozole for 2 weeks which did not change the pain.  She also has rather chronic joint pain in shoulder and hands. She takes tylenol which relieves some pain. The pain is the worst first thing in the morning. She also has history of Crohn/s disease.     She denies fever, chills, Wt loss, headaches, CP, SOB, N/V, abdominal pain, constipation, diarrhea, neuropathy, joint pain, extremity swelling, rashes. Appetite is good and she is drinking  fine.     Review of Systems:   A 14-point review of system was completed and was negative except for what is noted in HPI.      Allergies and Intolerances:       Allergies:   No Known Allergies          Outpatient Medication Profile:   Current Outpatient Medications on File Prior to Visit   Medication Sig Dispense Refill    anastrozole (Arimidex) 1 mg tablet Take 1 tablet (1 mg total) by mouth once daily.  Swallow whole with a drink of water. 90 tablet 1    calcium carbonate 600 mg calcium (1,500 mg) tablet 1 tablet Orally 1 x per day      cholecalciferol (Vitamin D-3) 5,000 Units tablet Take 1 tablet (5,000 Units) by mouth once daily.      Stelara injection Stelara 90 MG/ML Subcutaneous Solution Prefilled Syringe  Refills: 0  Start: 14-May-2021  Milliliter       No current facility-administered medications on file prior to visit.      Medical History:    Past Medical History:   Diagnosis Date    Abnormal Pap smear of cervix     Acute vaginitis 04/22/2021    Acute vaginitis    Colitis 11/10/2016    Cysts of both ovaries 11/09/2016    Fatigue 05/30/2019    Formatting of this note might be different from the original.   Reports fatigue. Does not rest well at night. Cherelle Yu RN 05/30/19       Last Assessment & Plan:    Formatting of this note might be different from the original.   Not addressed during this call.   Heather Thurman RN BSN 11/25/2019    Generalized abdominal pain 05/30/2019    Formatting of this note might be different from the original.   Reports abdominal cramping and day to day changes in bowel movements. Feels it may be diet related. Cherelle Yu RN 05/30/19       Last Assessment & Plan:    Formatting of this note might be different from the original.   · Bowel resection surgery on 10/14/19, was in the hospital for 7 days but member is recovering well.       HPV (human papilloma virus) infection     Kidney stone 11/2023    Malignant neoplasm of upper-outer quadrant of  right breast in female, estrogen receptor positive (Multi) 10/10/2023    Obesity, Class I, BMI 30-34.9 10/21/2019    Personal history of irradiation     Post-op pain 10/16/2019    Last Assessment & Plan:    Formatting of this note might be different from the original.   Multimodal pain regimen    Post-operative state 10/14/2019    Last Assessment & Plan:    Formatting of this note might be different from the original.   Advance diet to clears -- will advance to GI soft this PM if tolerating clears    Pain control   Encourage ambulation and IS use    Thrombocythemia, essential (Multi) 2019    Usual hyperplasia of lactiferous duct        Surgical History:   Past Surgical History:   Procedure Laterality Date    APPENDECTOMY  10/2019    BREAST BIOPSY      BREAST LUMPECTOMY Left 2023    CERVICAL BIOPSY  W/ LOOP ELECTRODE EXCISION      CHOLECYSTECTOMY Right 2020    KNEE ARTHROSCOPY W/ LATERAL RELEASE Left     SMALL INTESTINE SURGERY Right 10/14/2019    TONSILLECTOMY         Social Substance History:   Social History     Tobacco Use    Smoking status: Former     Current packs/day: 0.00     Average packs/day: 0.3 packs/day for 25.0 years (6.3 ttl pk-yrs)     Types: Cigarettes     Start date: 1994     Quit date: 2019     Years since quittin.8    Smokeless tobacco: Never   Substance Use Topics    Alcohol use: Yes     Alcohol/week: 2.0 standard drinks of alcohol     Types: 2 Glasses of wine per week     Social History     Substance and Sexual Activity   Drug Use Never       Family History:   Family History   Problem Relation Name Age of Onset    No Known Problems Mother      Esophageal cancer Father Mario     Cancer Father Mario     Hernia Father Mario     Hypertension Father Mario     Breast cancer Maternal Grandmother Drea 70    Kidney cancer Paternal Grandfather David     Cancer Paternal Grandfather David     Brain cancer Father's Brother Jp     Cancer Father's Brother Jp     Throat cancer Maternal  "Great-Grandmother      Heart disease Maternal Grandfather Tony         OBJECTIVE:     Visit Vitals  N/A     Pain Scale: 0      The ECOG performance scale today is Asymptomatic/0        Physical Exam:  N/A    Physical exam from 10/11/2024     Constitutional: Well developed, awake/alert/oriented  x3, no distress, alert and cooperative   Eyes: PERRL, EOMI, clear sclera   ENMT: mucous membranes moist, no apparent injury,  no lesions seen   Head/Neck: Neck supple, no apparent injury, thyroid  without mass or tenderness, No JVD, trachea midline, no bruits   Respiratory/Thorax: Patent airways, CTAB, normal  breath sounds with good chest expansion, thorax symmetric   Cardiovascular: Regular, rate and rhythm, no murmurs,  2+ equal pulses of the extremities, normal S 1and S 2   Gastrointestinal: Nondistended, soft, non-tender,  no rebound tenderness or guarding, no masses palpable, no organomegaly, +BS, no bruits   Musculoskeletal: ROM intact, no joint swelling, normal  strength   Extremities: No LE edema   Neurological: alert and oriented x3, intact senses,  motor, response and reflexes, normal strength   Breast: s/p rt sided partial mastectomy with well healed surgical incision. No palpable mass in either breast. No palpable axillary or supraclavicular lymphadenopathies bilaterally. No swelling of either upper extremity which had free range of motion.   Lymphatic: No significant lymphadenopathy   Psychological: Appropriate mood and behavior   Skin: Warm and dry, no lesions, no rashes          LAB RESULTS    Lab Results   Component Value Date    HGB 13.8 09/06/2023    HCT 41.5 09/06/2023     No results found for: \"NEUTROABS\"    No results for input(s): \"CREATININE\", \"BUN\", \"NA\", \"K\", \"CL\", \"CO2\" in the last 72 hours.  No components found for: \"CALCGFR\"  No results found for: \"GLUCOSE\"  No results found for: \"NA\", \"K\", \"CL\", \"CO2\", \"BUN\", \"CREATININE\", \"ALT\", \"AST\", \"GGT\", \"ALKPHOS\", \"BILITOT\"  No results found for: " "\"FOLATE\"  No results found for: \"[id number]\"  No results found for: \"TSH\"     Imaging:     No images are attached to the encounter.        Assessment and Plan:   Assessment     Amber Gaston is a 54 y.o. postmenopausal  female with right-sided invasive ductal carcinoma, Stage IIA (pT1c, pN1a(sn), cM0, G2, ER+, [place]-, HER2-, Oncotype DX score: 14). The patient's breast cancer was diagnosed on May 19, 2023, and is grade 1-2, estrogen receptor positive at >95%, progesterone receptor negative, and HER-2/vikas negative. Oncotype DX Recurrence Score of 14, translating to a 14% recurrence rate with systemic endocrine therapy and no apparent benefit from chemotherapy.     Patient is s/p primary breast surgery, radiation and currently on hormonal therapy and tolerating it well. No evidence of recurrence.      Mammogram on 05/23/2024: Benign (category 2)     DEXA scan on 10/24/2023 which was normal.     After holding arimidex for 2 weeks, she has not seen any improvement in her pain in hips.      Plan     Continue anastrozole  Bone scan ordered today  Take Vitamin D and Calcium supplements  Bilateral mammogram - Next due in 05/2025  Take Vitamin D and Calcium supplements  Will obtain DEXA in 2025 - Insurance covers screening DEXA q2y  Consider Adjuvant Zometa to de discussed if DEXA shows osteopenia/osteoporosis  MD visit in 6 months (sooner if bone scan is abnormal)  RTC on 4/25 2025      Dannielle Alvarez MD, PhD   Hematology/Oncology  [facility]    "

## 2024-10-25 ENCOUNTER — TELEMEDICINE (OUTPATIENT)
Dept: HEMATOLOGY/ONCOLOGY | Facility: CLINIC | Age: 54
End: 2024-10-25
Payer: COMMERCIAL

## 2024-10-25 DIAGNOSIS — Z17.0 MALIGNANT NEOPLASM OF UPPER-OUTER QUADRANT OF RIGHT BREAST IN FEMALE, ESTROGEN RECEPTOR POSITIVE: ICD-10-CM

## 2024-10-25 DIAGNOSIS — C50.411 MALIGNANT NEOPLASM OF UPPER-OUTER QUADRANT OF RIGHT BREAST IN FEMALE, ESTROGEN RECEPTOR POSITIVE: ICD-10-CM

## 2024-10-25 PROCEDURE — 99214 OFFICE O/P EST MOD 30 MIN: CPT | Performed by: INTERNAL MEDICINE

## 2024-11-18 ENCOUNTER — HOSPITAL ENCOUNTER (OUTPATIENT)
Dept: RADIOLOGY | Facility: CLINIC | Age: 54
Discharge: HOME | End: 2024-11-18
Payer: COMMERCIAL

## 2024-11-18 DIAGNOSIS — C50.411 MALIGNANT NEOPLASM OF UPPER-OUTER QUADRANT OF RIGHT BREAST IN FEMALE, ESTROGEN RECEPTOR POSITIVE: ICD-10-CM

## 2024-11-18 DIAGNOSIS — Z17.0 MALIGNANT NEOPLASM OF UPPER-OUTER QUADRANT OF RIGHT BREAST IN FEMALE, ESTROGEN RECEPTOR POSITIVE: ICD-10-CM

## 2024-11-18 PROCEDURE — 3430000001 HC RX 343 DIAGNOSTIC RADIOPHARMACEUTICALS: Performed by: INTERNAL MEDICINE

## 2024-11-18 PROCEDURE — 78306 BONE IMAGING WHOLE BODY: CPT | Performed by: STUDENT IN AN ORGANIZED HEALTH CARE EDUCATION/TRAINING PROGRAM

## 2024-11-18 PROCEDURE — A9503 TC99M MEDRONATE: HCPCS | Performed by: INTERNAL MEDICINE

## 2024-11-18 PROCEDURE — 78306 BONE IMAGING WHOLE BODY: CPT

## 2025-04-15 DIAGNOSIS — Z17.0 MALIGNANT NEOPLASM OF UPPER-OUTER QUADRANT OF RIGHT BREAST IN FEMALE, ESTROGEN RECEPTOR POSITIVE: ICD-10-CM

## 2025-04-15 DIAGNOSIS — C50.411 MALIGNANT NEOPLASM OF UPPER-OUTER QUADRANT OF RIGHT BREAST IN FEMALE, ESTROGEN RECEPTOR POSITIVE: ICD-10-CM

## 2025-04-15 RX ORDER — ANASTROZOLE 1 MG/1
TABLET ORAL
Qty: 90 TABLET | Refills: 1 | Status: SHIPPED | OUTPATIENT
Start: 2025-04-15

## 2025-04-30 NOTE — PROGRESS NOTES
Patient Visit Information:   Patient name: Amber Gaston  : 1970  Date of Service: 2025    Visit Type: Follow-up      Cancer History:          Breast         AJCC Edition: 8th (AJCC), Diagnosis Date:           Cancer Staging   Malignant neoplasm of upper-outer quadrant of right breast in female, estrogen receptor positive, Staging form: Breast, AJCC 8th Edition, Pathologic stage from 10/10/2023: Stage IIA (pT1c, pN1a(sn), cM0, G2, ER+, CT-, HER2-, Oncotype DX score: 14) - Signed by Daniella Tovar MD on 10/10/2023     Treatment Synopsis:       Amber Gaston is a 54 y.o. postmenopausal  female with right-sided invasive ductal carcinoma, Stage IIA (pT1c, pN1a(sn), cM0, G2, ER+, CT-, HER2-, Oncotype DX score: 14). The patient's breast cancer was diagnosed on May 19, 2023, and is grade 1-2, estrogen receptor positive at >95%, progesterone receptor negative, and HER-2/vikas negative. Oncotype DX Recurrence Score of 14, translating to a 14% recurrence rate with systemic endocrine therapy and no apparent benefit from chemotherapy.      CURRENT THERAPY: Arimidex      ONCOLOGIC HISTORY:  Details of her breast cancer history are as follows:     2023: Patient underwent a bilateral diagnostic mammogram and right breast US. This revealed a new mass in the right breast at 10:00, 4 cm from the nipple measuring 8 x 5 x 9 mm. 5 normal looking Lns were seen on the right. Benign coarse calcifications were seen in the left breast.   2023: Patient under an US-guided core biopsy of the right breast with results as above   2023: Patient underwent a bilateral MRI of the breast. This revealed area of biopsy proven malignancy in the right breast. In addition there was an additional suspicious area of linear non-mass enhancement in the right breast. MRI-guided biopsy was  recommended. No abnormality was noted in the left breast.  2023: Patient underwent an MRI-guided core needle biopsy of the  non-mass enhancement in the right breast. 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   07/26/2023: Patient underwent a right partial mastectomy and SLNB. Pathology was consistent with a single focus of invasive ductal carcinoma measuring 1.3 cm, grade 2, with 1/4 SLNs positive for carcinoma with victorino metastasis measuring 0.25mm. Invasive carcinoma was present at superior margin  09/06/2023: Patient underwent re-excision of superior margins of the right breast with no evidence of carcinoma  12/14/2023: Patient completed radiation at [facility]  12/15/2023: Started Anastrozole   4/12/2024: Last seen by Dr. Tracey  10/11/2024: Discontinued Arimidex due to side effects (pain). Discontinuing anastrozole did not change the hip pain.   11/18/2024: Bone scan - no evidence of osseous metastases   11/19/2025: Resumed anastrozole      History of Present Illness: Patient ID:  Amber Gaston is a 54 y.o. female.     Chief Complaint and/or reason for visit: Here for a follow-up     Interval History:    Amber Gaston is a pleasant  54 y.o. woman with history of right sided IDC.    She is here for a follow-up.     She agreed to be seen by me remotely (video conference system) for a follow-up.     Patient confirmed that she is in the [facility] at the time of the virtual visit.     She joined the remote visit from her office in [place].   I informed that I am licensed to practice medicine in the [facility]. I performed this visit from my clinic/office at .     An interactive video telecommunication system which permits real time communications between the patient (at the originating site) and provider (at the distant site) was utilized to provide this telehealth service.       Since she started anastrozole, she has hot flashes and some night sweats.   Otherwise no significant changes since the last visit.     Today her pain is about the same from chronic joint issues (hip and shoulder).   She takes tylenol  which relieves some pain. The pain is the worst first thing in the morning. She also has history of Crohn/s disease.     She denies fever, chills, Wt loss, headaches, CP, SOB, N/V, abdominal pain, constipation, diarrhea, neuropathy, joint pain, extremity swelling, rashes. Appetite is good and she is drinking fine.     Review of Systems:   A 14-point review of system was completed and was negative except for what is noted in HPI.      Allergies and Intolerances:       Allergies:   No Known Allergies          Outpatient Medication Profile:   Current Outpatient Medications on File Prior to Visit   Medication Sig Dispense Refill    anastrozole (Arimidex) 1 mg tablet TAKE 1 TABLET ONCE DAILY.  SWALLOW WHOLE WITH A DRINK OF WATER. 90 tablet 1    calcium carbonate 600 mg calcium (1,500 mg) tablet 1 tablet Orally 1 x per day      cholecalciferol (Vitamin D-3) 5,000 Units tablet Take 1 tablet (5,000 Units) by mouth once daily.      Stelara injection Stelara 90 MG/ML Subcutaneous Solution Prefilled Syringe  Refills: 0  Start: 14-May-2021  Milliliter       No current facility-administered medications on file prior to visit.      Medical History:    Past Medical History:   Diagnosis Date    Abnormal Pap smear of cervix     Acute vaginitis 04/22/2021    Acute vaginitis    Colitis 11/10/2016    Cysts of both ovaries 11/09/2016    Fatigue 05/30/2019    Formatting of this note might be different from the original.   Reports fatigue. Does not rest well at night. Cherelle Yu RN 05/30/19       Last Assessment & Plan:    Formatting of this note might be different from the original.   Not addressed during this call.   Heather Thurman RN BSN 11/25/2019    Generalized abdominal pain 05/30/2019    Formatting of this note might be different from the original.   Reports abdominal cramping and day to day changes in bowel movements. Feels it may be diet related. Cherelle Yu RN 05/30/19       Last Assessment & Plan:     Formatting of this note might be different from the original.   · Bowel resection surgery on 10/14/19, was in the hospital for 7 days but member is recovering well.       HPV (human papilloma virus) infection     Kidney stone 2023    Malignant neoplasm of upper-outer quadrant of right breast in female, estrogen receptor positive (Multi) 10/10/2023    Obesity, Class I, BMI 30-34.9 10/21/2019    Personal history of irradiation     Post-op pain 10/16/2019    Last Assessment & Plan:    Formatting of this note might be different from the original.   Multimodal pain regimen    Post-operative state 10/14/2019    Last Assessment & Plan:    Formatting of this note might be different from the original.   Advance diet to clears -- will advance to GI soft this PM if tolerating clears    Pain control   Encourage ambulation and IS use    Thrombocythemia, essential (Multi) 2019    Usual hyperplasia of lactiferous duct        Surgical History:   Past Surgical History:   Procedure Laterality Date    APPENDECTOMY  10/2019    BREAST BIOPSY      BREAST LUMPECTOMY Left 2023    CERVICAL BIOPSY  W/ LOOP ELECTRODE EXCISION      CHOLECYSTECTOMY Right 2020    KNEE ARTHROSCOPY W/ LATERAL RELEASE Left     SMALL INTESTINE SURGERY Right 10/14/2019    TONSILLECTOMY         Social Substance History:   Social History     Tobacco Use    Smoking status: Former     Current packs/day: 0.00     Average packs/day: 0.3 packs/day for 25.0 years (6.3 ttl pk-yrs)     Types: Cigarettes     Start date: 1994     Quit date: 2019     Years since quittin.3    Smokeless tobacco: Never   Substance Use Topics    Alcohol use: Yes     Alcohol/week: 2.0 standard drinks of alcohol     Types: 2 Glasses of wine per week     Social History     Substance and Sexual Activity   Drug Use Never       Family History:   Family History   Problem Relation Name Age of Onset    No Known Problems Mother      Esophageal cancer Father Mario     Cancer  "Father Mario     Hernia Father Mario     Hypertension Father Mario     Breast cancer Maternal Grandmother Drea 70    Kidney cancer Paternal Grandfather David     Cancer Paternal Grandfather David     Brain cancer Father's Brother Jp     Cancer Father's Brother Jp     Throat cancer Maternal Great-Grandmother      Heart disease Maternal Grandfather Tony         OBJECTIVE:     Visit Vitals  N/A     Pain Scale: 0      The ECOG performance scale today is Asymptomatic/0        Physical Exam:  N/A    Physical exam from 10/11/2024     Constitutional: Well developed, awake/alert/oriented  x3, no distress, alert and cooperative   Eyes: PERRL, EOMI, clear sclera   ENMT: mucous membranes moist, no apparent injury,  no lesions seen   Head/Neck: Neck supple, no apparent injury, thyroid  without mass or tenderness, No JVD, trachea midline, no bruits   Respiratory/Thorax: Patent airways, CTAB, normal  breath sounds with good chest expansion, thorax symmetric   Cardiovascular: Regular, rate and rhythm, no murmurs,  2+ equal pulses of the extremities, normal S 1and S 2   Gastrointestinal: Nondistended, soft, non-tender,  no rebound tenderness or guarding, no masses palpable, no organomegaly, +BS, no bruits   Musculoskeletal: ROM intact, no joint swelling, normal  strength   Extremities: No LE edema   Neurological: alert and oriented x3, intact senses,  motor, response and reflexes, normal strength   Breast: s/p rt sided partial mastectomy with well healed surgical incision. No palpable mass in either breast. No palpable axillary or supraclavicular lymphadenopathies bilaterally. No swelling of either upper extremity which had free range of motion.   Lymphatic: No significant lymphadenopathy   Psychological: Appropriate mood and behavior   Skin: Warm and dry, no lesions, no rashes          LAB RESULTS    Lab Results   Component Value Date    HGB 13.8 09/06/2023    HCT 41.5 09/06/2023     No results found for: \"NEUTROABS\"    No results " "for input(s): \"CREATININE\", \"BUN\", \"NA\", \"K\", \"CL\", \"CO2\" in the last 72 hours.  No components found for: \"CALCGFR\"  No results found for: \"GLUCOSE\"  No results found for: \"NA\", \"K\", \"CL\", \"CO2\", \"BUN\", \"CREATININE\", \"ALT\", \"AST\", \"GGT\", \"ALKPHOS\", \"BILITOT\"  No results found for: \"FOLATE\"  No results found for: \"MLRODPGI13\"  No results found for: \"TSH\"     Imaging:     No images are attached to the encounter.        Assessment and Plan:   Assessment     Amber Gaston is a 54 y.o. postmenopausal  female with right-sided invasive ductal carcinoma, Stage IIA (pT1c, pN1a(sn), cM0, G2, ER+, [place]-, HER2-, Oncotype DX score: 14). The patient's breast cancer was diagnosed on May 19, 2023, and is grade 1-2, estrogen receptor positive at >95%, progesterone receptor negative, and HER-2/vikas negative. Oncotype DX Recurrence Score of 14, translating to a 14% recurrence rate with systemic endocrine therapy and no apparent benefit from chemotherapy.     Patient is s/p primary breast surgery, radiation and currently on hormonal therapy and tolerating it well. No evidence of recurrence.      Mammogram on 05/23/2024: Benign (category 2)     DEXA scan on 10/24/2023 which was normal.     After holding arimidex for 2 weeks, she has not seen any improvement in her pain in hips.      Plan     Continue anastrozole  Take Vitamin D and Calcium supplements  Bilateral mammogram - Next due in 05/2025  Take Vitamin D and Calcium supplements  Will obtain DEXA in 2025 - Insurance covers screening DEXA q2y (after10/24/2025) - order placed today  Consider Adjuvant Zometa to de discussed if DEXA shows osteopenia/osteoporosis  MD visit in 6 months (to review DEXA result)   RTC on 11/10/2025      Dannielle Alvarez MD, PhD   Hematology/Oncology  [place]    "

## 2025-05-02 ENCOUNTER — TELEMEDICINE (OUTPATIENT)
Dept: HEMATOLOGY/ONCOLOGY | Facility: CLINIC | Age: 55
End: 2025-05-02
Payer: COMMERCIAL

## 2025-05-02 DIAGNOSIS — Z17.0 MALIGNANT NEOPLASM OF UPPER-OUTER QUADRANT OF RIGHT BREAST IN FEMALE, ESTROGEN RECEPTOR POSITIVE: ICD-10-CM

## 2025-05-02 DIAGNOSIS — C50.411 MALIGNANT NEOPLASM OF UPPER-OUTER QUADRANT OF RIGHT BREAST IN FEMALE, ESTROGEN RECEPTOR POSITIVE: ICD-10-CM

## 2025-05-02 PROCEDURE — 99214 OFFICE O/P EST MOD 30 MIN: CPT | Performed by: INTERNAL MEDICINE

## 2025-05-02 NOTE — PATIENT INSTRUCTIONS
Schedule dexa bone density scan after 10/24.  Please schedule follow up with Dr. Alvarez  on 11/10  Please call [phone] (option 5, then option 2) with symptoms, questions or concerns.

## 2025-05-07 NOTE — PROGRESS NOTES
Amber Gaston female   1970 54 y.o.   [id number]      Chief Complaint  Annual mammogram and exam, history of right breast cancer    History Of Present Illness  Amber Gaston is a pleasant 54 year old female s/p right Magseed localized partial mastectomy and sentinel lymph node biopsy on 23 yielding invasive ductal carcinoma, 1.3 cm, grade 2, positive superior margin, 1/4 LNs with micromets; ER+95%, GA-, HER2. 9/15/23 she returned to the OR for right breast re-excision partial mastectomy for margins, right breast local tissue rearrangement. Final margins negative. She completed post-operative radiation with [facility] on 2023. She started Anastrozole on 12/15/2023, currently taking and tolerating well with a 5-10 year plan. She presents today for annual mammogram and exam. She denies any new masses or lumps.   Stage IB [id number]    BREAST IMAGIN2024 Bilateral diagnostic mammogram, indicates BI-RADS Category 2.     REPRODUCTIVE HISTORY: menarche age 12, nulliparous, OCP's x 20 years, natural menopause age 49, HRT x 3 years, heterogeneously dense tissue    FAMILY CANCER HISTORY:   Maternal Grandmother: Breast cancer, age 70  Paternal Grandfather: Renal cancer, age 65  Father: Esophageal cancer, age 77     Surgical History  She has a past surgical history that includes Breast lumpectomy (Left, 2023); Tonsillectomy; Knee arthroscopy w/ lateral release (Left); Small intestine surgery (Right, 10/14/2019); Cholecystectomy (Right, 2020); Breast biopsy (May 2023); Appendectomy (10/2019); and Cervical biopsy w/ loop electrode excision.     Social History  She reports that she quit smoking about 6 years ago. Her smoking use included cigarettes. She started smoking about 31 years ago. She has a 6.3 pack-year smoking history. She has never used smokeless tobacco. She reports current alcohol use of about 2.0 standard drinks of alcohol per week. She reports that she does not use  drugs.    Family History  Family History   Problem Relation Name Age of Onset    No Known Problems Mother      Esophageal cancer Father Mario     Cancer Father Mario     Hernia Father Mario     Hypertension Father Mario     Breast cancer Maternal Grandmother Drea 70    Cancer Maternal Grandmother Drea     Kidney cancer Paternal Grandfather David     Cancer Paternal Grandfather David     Brain cancer Father's Brother Jp     Cancer Father's Brother Jp     Throat cancer Maternal Great-Grandmother      Heart disease Maternal Grandfather Tony     Cancer Father's Brother Edwardo         Allergies  Patient has no known allergies.    Medications  Current Outpatient Medications   Medication Instructions    anastrozole (Arimidex) 1 mg tablet TAKE 1 TABLET ONCE DAILY.  SWALLOW WHOLE WITH A DRINK OF WATER.    calcium carbonate 600 mg calcium (1,500 mg) tablet 1 tablet Orally 1 x per day    cholecalciferol (VITAMIN D-3) 5,000 Units, oral, Daily RT    risankizumab-rzaa (SKYRIZI IV)     Stelara injection Stelara 90 MG/ML Subcutaneous Solution Prefilled Syringe  Refills: 0  Start: 14-May-2021  Milliliter         REVIEW OF SYSTEMS    Constitutional:  Negative for appetite change, fatigue, fever and unexpected weight change.   HENT:  Negative for ear pain, hearing loss, nosebleeds, sore throat and trouble swallowing.    Eyes:  Negative for discharge, itching and visual disturbance.   Respiratory:  Negative for cough, chest tightness and shortness of breath.    Cardiovascular:  Negative for chest pain, palpitations and leg swelling.   Breast: as indicated in HPI  Gastrointestinal:  Negative for abdominal pain, constipation, diarrhea and nausea.   Endocrine: Negative for cold intolerance and heat intolerance.   Genitourinary:  Negative for dysuria, frequency, hematuria, pelvic pain and vaginal bleeding.   Musculoskeletal:  Negative for arthralgias, back pain, gait problem, joint swelling and myalgias.   Skin:  Negative for color change  and rash.   Allergic/Immunologic: Negative for environmental allergies and food allergies.   Neurological:  Negative for dizziness, tremors, speech difficulty, weakness, numbness and headaches.   Hematological:  Does not bruise/bleed easily.   Psychiatric/Behavioral:  Negative for agitation, dysphoric mood and sleep disturbance. The patient is not nervous/anxious.         Past Medical History  She has a past medical history of Abnormal Pap smear of cervix, Acute vaginitis (04/22/2021), Colitis (11/10/2016), Crohn's disease (Multi) (2016), Cysts of both ovaries (11/09/2016), Fatigue (05/30/2019), Generalized abdominal pain (05/30/2019), GERD (gastroesophageal reflux disease) (2016), HPV (human papilloma virus) infection, Kidney stone (11/2023), Malignant neoplasm of upper-outer quadrant of right breast in female, estrogen receptor positive (10/10/2023), Obesity, Class I, BMI 30-34.9 (10/21/2019), Personal history of irradiation (November 2023), Post-op pain (10/16/2019), Post-operative state (10/14/2019), Thrombocythemia, essential (Multi) (06/21/2019), and Usual hyperplasia of lactiferous duct.     Physical Exam  Patient is alert and oriented x3 and in a relaxed and appropriate mood. Her gait is steady and hand grasps are equal. Sclera is clear. The breasts are slightly asymmetrical, left larger. The left breast tissue is soft without palpable abnormalities, discrete nodules or masses. The right breast has a well-healed partial circumareolar incision and a well-healed right axillary incision. The right nipple areolar complex has very mild thickening and moderate hyperpigmentation consistent with post-operative radiation changes. The right breast tissue is thickened secondary to radiation, but without palpable abnormalities, discrete nodules or masses. The left breast skin and nipple appear normal. There is no cervical, supraclavicular or axillary lymphadenopathy. Heart rate and rhythm normal, S1 and S2 appreciated.  The lungs are clear to auscultation bilaterally.      Physical Exam  Chest:              Last Recorded Vitals  Vitals:    05/29/25 0938   BP: 162/88   Pulse: 74   Temp: 36.4 °C (97.6 °F)   SpO2: 100%         Relevant Results   Time was spent viewing digital images of the radiology testing with the patient, results pending    Assessment/Plan   Normal clinical exam, screening mammogram, history of right breast cancer, family history of breast cancer, Anastrozole therapy, heterogeneously dense tissue    Plan: Return in one year for bilateral screening mammogram and office visit. Continue Anastrozole 1mg daily.    Patient Discussion/Summary  Your clinical examination is normal. Please return in one year for bilateral screening mammogram and office visit or sooner if you have any problems or concerns. Continue Anastrozole 1mg daily.     You can see your health information, review clinical summaries from office visits & test results online when you follow your health with MY  Chart, a personal health record. To sign up go to [email or web address]. If you need assistance with signing up or trouble getting into your account call [facility] Patient Line 24/7 at [phone].    My office phone number is [phone] if you need to get in touch with me or have additional questions or concerns. Thank you for choosing [facility] and trusting me as your healthcare provider. I look forward to seeing you again at your next office visit. I am honored to be a provider on your health care team and I remain dedicated to helping you achieve your health goals.      Queenie Monroe, APRN-CNP

## 2025-05-29 ENCOUNTER — HOSPITAL ENCOUNTER (OUTPATIENT)
Dept: RADIOLOGY | Facility: CLINIC | Age: 55
Discharge: HOME | End: 2025-05-29
Payer: COMMERCIAL

## 2025-05-29 ENCOUNTER — OFFICE VISIT (OUTPATIENT)
Dept: SURGICAL ONCOLOGY | Facility: CLINIC | Age: 55
End: 2025-05-29
Payer: COMMERCIAL

## 2025-05-29 VITALS
HEART RATE: 74 BPM | BODY MASS INDEX: 31.01 KG/M2 | OXYGEN SATURATION: 100 % | TEMPERATURE: 97.6 F | SYSTOLIC BLOOD PRESSURE: 162 MMHG | DIASTOLIC BLOOD PRESSURE: 88 MMHG | WEIGHT: 175 LBS

## 2025-05-29 VITALS — WEIGHT: 170.64 LBS | HEIGHT: 63 IN | BODY MASS INDEX: 30.23 KG/M2

## 2025-05-29 DIAGNOSIS — Z08 ENCOUNTER FOR FOLLOW-UP SURVEILLANCE OF BREAST CANCER: ICD-10-CM

## 2025-05-29 DIAGNOSIS — Z85.3 ENCOUNTER FOR FOLLOW-UP SURVEILLANCE OF BREAST CANCER: ICD-10-CM

## 2025-05-29 DIAGNOSIS — Z79.811 USE OF ANASTROZOLE: Primary | ICD-10-CM

## 2025-05-29 PROCEDURE — 1036F TOBACCO NON-USER: CPT | Performed by: NURSE PRACTITIONER

## 2025-05-29 PROCEDURE — 77067 SCR MAMMO BI INCL CAD: CPT | Performed by: RADIOLOGY

## 2025-05-29 PROCEDURE — 99213 OFFICE O/P EST LOW 20 MIN: CPT | Performed by: NURSE PRACTITIONER

## 2025-05-29 PROCEDURE — 77063 BREAST TOMOSYNTHESIS BI: CPT | Performed by: RADIOLOGY

## 2025-05-29 PROCEDURE — 77063 BREAST TOMOSYNTHESIS BI: CPT

## 2025-05-29 ASSESSMENT — PAIN SCALES - GENERAL: PAINLEVEL_OUTOF10: 0-NO PAIN

## 2025-05-29 NOTE — PATIENT INSTRUCTIONS
Your clinical examination is normal. Please return in one year for bilateral screening mammogram and office visit or sooner if you have any problems or concerns. Continue Anastrozole 1mg daily.     You can see your health information, review clinical summaries from office visits & test results online when you follow your health with MY  Chart, a personal health record. To sign up go to [email or web address]. If you need assistance with signing up or trouble getting into your account call [facility] Patient Line 24/7 at [phone].    My office phone number is [phone] if you need to get in touch with me or have additional questions or concerns. Thank you for choosing [facility] and trusting me as your healthcare provider. I look forward to seeing you again at your next office visit. I am honored to be a provider on your health care team and I remain dedicated to helping you achieve your health goals.

## 2025-10-24 ENCOUNTER — APPOINTMENT (OUTPATIENT)
Dept: RADIOLOGY | Facility: CLINIC | Age: 55
End: 2025-10-24
Payer: COMMERCIAL